# Patient Record
Sex: MALE | Race: OTHER | URBAN - METROPOLITAN AREA
[De-identification: names, ages, dates, MRNs, and addresses within clinical notes are randomized per-mention and may not be internally consistent; named-entity substitution may affect disease eponyms.]

---

## 2018-01-22 ENCOUNTER — OCC HEALTH (OUTPATIENT)
Dept: OCCUPATIONAL MEDICINE | Age: 48
End: 2018-01-22
Attending: PHYSICIAN ASSISTANT

## 2018-01-23 ENCOUNTER — OFFICE VISIT (OUTPATIENT)
Dept: INTERNAL MEDICINE CLINIC | Facility: CLINIC | Age: 48
End: 2018-01-23

## 2018-01-23 VITALS
SYSTOLIC BLOOD PRESSURE: 144 MMHG | WEIGHT: 304.75 LBS | TEMPERATURE: 99 F | OXYGEN SATURATION: 95 % | HEART RATE: 88 BPM | HEIGHT: 68.5 IN | BODY MASS INDEX: 45.66 KG/M2 | DIASTOLIC BLOOD PRESSURE: 104 MMHG | RESPIRATION RATE: 22 BRPM

## 2018-01-23 DIAGNOSIS — I10 ESSENTIAL HYPERTENSION: Primary | ICD-10-CM

## 2018-01-23 PROCEDURE — 99202 OFFICE O/P NEW SF 15 MIN: CPT | Performed by: INTERNAL MEDICINE

## 2018-01-23 RX ORDER — LOSARTAN POTASSIUM 25 MG/1
25 TABLET ORAL DAILY
Qty: 30 TABLET | Refills: 3 | Status: SHIPPED | OUTPATIENT
Start: 2018-01-23

## 2018-01-23 NOTE — PROGRESS NOTES
[address] [facility] Internal Medicine Office Note  Chief Complaint:   Patient presents with:  Establish Care  Blood Pressure: at work screening       HPI:   This is a 52year old male new patient coming in for high blood pressure  HPI  He has had several groomed. Physical Exam   Vitals reviewed. Constitutional: He is obese. No distress. HENT:   Head: Normocephalic and atraumatic. Right Ear: Tympanic membrane is not erythematous. Left Ear: Tympanic membrane is not erythematous.    Mouth/Throat: No p result of today.      Annamaria Vernon MD

## 2018-01-23 NOTE — PATIENT INSTRUCTIONS
1. Start losartan 25mg once daily    2. Monitor blood pressure at home - buy or borrow a home blood pressure cuff. Check blood pressure at least 2 hours after taking medication. (If you take in the evening, it is ok to check in the morning)    3.  Call us i

## 2018-04-08 NOTE — LETTER
May 1, 2019    [address] [address] [facility]      Dear Terencefrankie Worthy:      Our records show that you have chosen Nick Dominguez M.D. as your pcp (primary care physician) however you were last seen in our office on 11/10/2016.   I
Stable

## 2019-04-22 ENCOUNTER — PATIENT OUTREACH (OUTPATIENT)
Dept: INTERNAL MEDICINE CLINIC | Facility: CLINIC | Age: 49
End: 2019-04-22

## 2019-04-22 NOTE — PROGRESS NOTES
Pt was only seen once a year ago, is he still a patient here?  Needs to follow up ASAP if so on HTN and to re-establish care

## 2019-09-27 ENCOUNTER — OFFICE VISIT (OUTPATIENT)
Dept: OCCUPATIONAL MEDICINE | Age: 49
End: 2019-09-27
Attending: PHYSICIAN ASSISTANT

## 2022-05-01 ENCOUNTER — MED REC SCAN ONLY (OUTPATIENT)
Dept: ORTHOPEDICS CLINIC | Facility: CLINIC | Age: 52
End: 2022-05-01

## 2022-05-02 ENCOUNTER — TELEPHONE (OUTPATIENT)
Dept: ORTHOPEDICS CLINIC | Facility: CLINIC | Age: 52
End: 2022-05-02

## 2022-05-02 NOTE — TELEPHONE ENCOUNTER
Left knee xrays ordered. Attempted to contact patient regarding xray order instructions. Pt did not answer, and no voicemail picked up.

## 2022-05-02 NOTE — TELEPHONE ENCOUNTER
Patient coming in for left knee pain. Patient has not had any imaging done. If imaging is required please place Rx. Thank you.     Future Appointments   Date Time Provider Rosa Briggs   5/16/2022 10:00 AM Daniel Calvillo MD [facility] [id number]

## 2022-05-16 ENCOUNTER — HOSPITAL ENCOUNTER (OUTPATIENT)
Dept: GENERAL RADIOLOGY | Age: 52
Discharge: HOME | End: 2022-05-16
Attending: ORTHOPAEDIC SURGERY
Payer: COMMERCIAL

## 2022-05-16 ENCOUNTER — OFFICE VISIT (OUTPATIENT)
Dept: ORTHOPEDICS CLINIC | Facility: CLINIC | Age: 52
End: 2022-05-16
Payer: COMMERCIAL

## 2022-05-16 VITALS — HEIGHT: 69 IN | WEIGHT: 315 LBS | BODY MASS INDEX: 46.65 KG/M2

## 2022-05-16 DIAGNOSIS — S83.207A POSITIVE MCMURRAY TEST OF LEFT KNEE, INITIAL ENCOUNTER: Primary | ICD-10-CM

## 2022-05-16 DIAGNOSIS — M25.562 LEFT KNEE PAIN, UNSPECIFIED CHRONICITY: ICD-10-CM

## 2022-05-16 DIAGNOSIS — Z01.89 ENCOUNTER FOR LOWER EXTREMITY COMPARISON IMAGING STUDY: ICD-10-CM

## 2022-05-16 PROCEDURE — 73562 X-RAY EXAM OF KNEE 3: CPT | Performed by: ORTHOPAEDIC SURGERY

## 2022-05-16 PROCEDURE — 3008F BODY MASS INDEX DOCD: CPT | Performed by: ORTHOPAEDIC SURGERY

## 2022-05-16 PROCEDURE — 73564 X-RAY EXAM KNEE 4 OR MORE: CPT | Performed by: ORTHOPAEDIC SURGERY

## 2022-05-16 PROCEDURE — 99205 OFFICE O/P NEW HI 60 MIN: CPT | Performed by: ORTHOPAEDIC SURGERY

## 2022-05-23 ENCOUNTER — TELEPHONE (OUTPATIENT)
Dept: ORTHOPEDICS CLINIC | Facility: CLINIC | Age: 52
End: 2022-05-23

## 2022-05-23 NOTE — TELEPHONE ENCOUNTER
Future Appointments   Date Time Provider Rosa Brigitte   5/27/2022 10:20 AM Laurence Escobar MD [facility] [id number]     Patient is scheduled for appt. for his MRI results per Dr. Johnson Watts.

## 2022-05-27 ENCOUNTER — OFFICE VISIT (OUTPATIENT)
Dept: ORTHOPEDICS CLINIC | Facility: CLINIC | Age: 52
End: 2022-05-27
Payer: COMMERCIAL

## 2022-05-27 VITALS — HEIGHT: 69 IN | BODY MASS INDEX: 46.65 KG/M2 | WEIGHT: 315 LBS

## 2022-05-27 DIAGNOSIS — S83.232A COMPLEX TEAR OF MEDIAL MENISCUS OF LEFT KNEE AS CURRENT INJURY, INITIAL ENCOUNTER: ICD-10-CM

## 2022-05-27 PROCEDURE — 99215 OFFICE O/P EST HI 40 MIN: CPT | Performed by: ORTHOPAEDIC SURGERY

## 2022-05-27 PROCEDURE — 3008F BODY MASS INDEX DOCD: CPT | Performed by: ORTHOPAEDIC SURGERY

## 2022-05-27 NOTE — PROGRESS NOTES
OR BOOKING SHEET KNEE ARTHROSCOPY  Name: Cheri Urban   MRN: [id number]   : 3/9/1970  Diagnosis:  [x] BMI 40.0-44.9, [facility] [Z68.41]  Disposition:    [x] Ambulatory  Operative Time Required: 1 hour  Procedure:  Antibiotics: 2 g cefazolin within 60 minutes of surgical incision (3 g if > 120 kg)  Laterality: [] RIGHT  [x] LEFT                       [] BILATERAL  Procedures:   [x] Knee Arthroscopy       [x] Partial Medial Meniscectomy (49924)   [x] Synovectomy, 2+ Compartments (02590)   [] Irrigation & Debridement (74733)   [] Manipulation Under Anesthesia (99046)    Injections:   [x] PRP Injection (86669)    [x] Operative knee  [] Non-operative knee   [] Stem cells from bone marrow aspiration (35938)    From:  [] Left Iliac crest  [] Right Iliac crest    To:  [] Left knee  [] Right knee  [] Other     Additional info:   [x] PCP Clearance Needed  [] MRSA  [x] Physical Therapy External - [facility]  [] DME Rx  Implants needed: None  Positioning Equipment: Supine with Lateral Post

## 2022-06-07 ENCOUNTER — TELEPHONE (OUTPATIENT)
Dept: ORTHOPEDICS CLINIC | Facility: CLINIC | Age: 52
End: 2022-06-07

## 2022-06-08 ENCOUNTER — TELEPHONE (OUTPATIENT)
Dept: ORTHOPEDICS CLINIC | Facility: CLINIC | Age: 52
End: 2022-06-08

## 2022-06-08 DIAGNOSIS — S83.232A COMPLEX TEAR OF MEDIAL MENISCUS, CURRENT INJURY, LEFT KNEE, INITIAL ENCOUNTER: Primary | ICD-10-CM

## 2022-06-09 ENCOUNTER — TELEPHONE (OUTPATIENT)
Dept: ORTHOPEDICS CLINIC | Facility: CLINIC | Age: 52
End: 2022-06-09

## 2022-06-09 NOTE — TELEPHONE ENCOUNTER
Patient's PCP office is requesting the pre-op order for this patient's upcoming surgery. Order can be faxed to [phone]. Please advise.

## 2022-06-09 NOTE — TELEPHONE ENCOUNTER
Surgeon: Dr. Willem Gonzales    Date of Surgery: 7/7/22       Post Op Appt: 7/15/22 @ 9am     Prior Authorization:   Inpatient or Outpatient: Outpatient  Facility: [facility]  Work Comp: NO  CPT: 88234, 85872,    DX: Z55.822M    Surgical Assistant: Reddy Dallas    Preadmission Testing: PER ANESTHESIA GUIDELINES     Pre-Op Clearance Requested: HISTORY AND PHYSICAL and MEDICAL CLEARANCE    DME:  NONE NEEDED    Rehab Services Ordered: EXTERNAL THERAPY ACHIEVE Patrick Salvador     Disability Paperwork: NONE    On [place] Calendar: YES    Notes: LT KNEE ARTHROSCOPY PMM

## 2022-06-09 NOTE — TELEPHONE ENCOUNTER
Protocol failed     Requesting: Losartan 25mg     LOV: 1/23/18   RTC: none noted   Filled: 1/23/18 #30 3 refills   Recent Labs: none     Upcoming OV: 6/16/22 with RP

## 2022-06-10 RX ORDER — LOSARTAN POTASSIUM 25 MG/1
25 TABLET ORAL DAILY
Qty: 7 TABLET | Refills: 0 | Status: SHIPPED | OUTPATIENT
Start: 2022-06-10

## 2022-06-16 ENCOUNTER — OFFICE VISIT (OUTPATIENT)
Dept: INTERNAL MEDICINE CLINIC | Facility: CLINIC | Age: 52
End: 2022-06-16
Payer: COMMERCIAL

## 2022-06-16 VITALS
HEIGHT: 68.47 IN | SYSTOLIC BLOOD PRESSURE: 130 MMHG | WEIGHT: 315 LBS | TEMPERATURE: 99 F | OXYGEN SATURATION: 98 % | BODY MASS INDEX: 47.19 KG/M2 | DIASTOLIC BLOOD PRESSURE: 72 MMHG | HEART RATE: 86 BPM | RESPIRATION RATE: 16 BRPM

## 2022-06-16 DIAGNOSIS — S83.232D COMPLEX TEAR OF MEDIAL MENISCUS OF LEFT KNEE AS CURRENT INJURY, SUBSEQUENT ENCOUNTER: ICD-10-CM

## 2022-06-16 DIAGNOSIS — E66.01 MORBID OBESITY (HCC): ICD-10-CM

## 2022-06-16 DIAGNOSIS — I10 PRIMARY HYPERTENSION: ICD-10-CM

## 2022-06-16 DIAGNOSIS — Z01.818 PREOPERATIVE EXAMINATION: Primary | ICD-10-CM

## 2022-06-16 PROCEDURE — 3075F SYST BP GE 130 - 139MM HG: CPT | Performed by: INTERNAL MEDICINE

## 2022-06-16 PROCEDURE — 3008F BODY MASS INDEX DOCD: CPT | Performed by: INTERNAL MEDICINE

## 2022-06-16 PROCEDURE — 99244 OFF/OP CNSLTJ NEW/EST MOD 40: CPT | Performed by: INTERNAL MEDICINE

## 2022-06-16 PROCEDURE — 3078F DIAST BP <80 MM HG: CPT | Performed by: INTERNAL MEDICINE

## 2022-06-16 RX ORDER — LOSARTAN POTASSIUM 50 MG/1
50 TABLET ORAL DAILY
Qty: 90 TABLET | Refills: 1 | Status: SHIPPED | OUTPATIENT
Start: 2022-06-16

## 2022-06-20 ENCOUNTER — TELEPHONE (OUTPATIENT)
Dept: INTERNAL MEDICINE CLINIC | Facility: CLINIC | Age: 52
End: 2022-06-20

## 2022-06-20 NOTE — TELEPHONE ENCOUNTER
Outgoing Fax   Faxed H&P and Clearance to   (f) [phone] and (f) [phone]  Status CP for both numbers

## 2022-07-01 ENCOUNTER — LABORATORY ENCOUNTER (OUTPATIENT)
Dept: LAB | Age: 52
End: 2022-07-01
Attending: ORTHOPAEDIC SURGERY
Payer: COMMERCIAL

## 2022-07-01 ENCOUNTER — EKG ENCOUNTER (OUTPATIENT)
Dept: LAB | Age: 52
End: 2022-07-01
Attending: ORTHOPAEDIC SURGERY
Payer: COMMERCIAL

## 2022-07-01 DIAGNOSIS — Z20.822 ENCOUNTER FOR PREOPERATIVE SCREENING LABORATORY TESTING FOR COVID-19 VIRUS: ICD-10-CM

## 2022-07-01 DIAGNOSIS — Z01.812 ENCOUNTER FOR PREOPERATIVE SCREENING LABORATORY TESTING FOR COVID-19 VIRUS: ICD-10-CM

## 2022-07-01 LAB
ANION GAP SERPL CALC-SCNC: 4 MMOL/L (ref 0–18)
ATRIAL RATE: 83 BPM
BUN BLD-MCNC: 11 MG/DL (ref 7–18)
CALCIUM BLD-MCNC: 9.1 MG/DL (ref 8.5–10.1)
CHLORIDE SERPL-SCNC: 109 MMOL/L (ref 98–112)
CO2 SERPL-SCNC: 27 MMOL/L (ref 21–32)
CREAT BLD-MCNC: 1.22 MG/DL
FASTING STATUS PATIENT QL REPORTED: YES
GLUCOSE BLD-MCNC: 106 MG/DL (ref 70–99)
OSMOLALITY SERPL CALC.SUM OF ELEC: 290 MOSM/KG (ref 275–295)
P AXIS: 49 DEGREES
P-R INTERVAL: 158 MS
POTASSIUM SERPL-SCNC: 3.9 MMOL/L (ref 3.5–5.1)
Q-T INTERVAL: 374 MS
QRS DURATION: 90 MS
QTC CALCULATION (BEZET): 439 MS
R AXIS: 1 DEGREES
SODIUM SERPL-SCNC: 140 MMOL/L (ref 136–145)
T AXIS: 49 DEGREES
VENTRICULAR RATE: 83 BPM

## 2022-07-01 PROCEDURE — 80048 BASIC METABOLIC PNL TOTAL CA: CPT

## 2022-07-01 PROCEDURE — 93005 ELECTROCARDIOGRAM TRACING: CPT

## 2022-07-01 PROCEDURE — 36415 COLL VENOUS BLD VENIPUNCTURE: CPT

## 2022-07-01 PROCEDURE — 93010 ELECTROCARDIOGRAM REPORT: CPT | Performed by: INTERNAL MEDICINE

## 2022-07-05 ENCOUNTER — LAB ENCOUNTER (OUTPATIENT)
Dept: LAB | Age: 52
End: 2022-07-05
Attending: ORTHOPAEDIC SURGERY
Payer: COMMERCIAL

## 2022-07-05 ENCOUNTER — TELEPHONE (OUTPATIENT)
Dept: ORTHOPEDICS CLINIC | Facility: CLINIC | Age: 52
End: 2022-07-05

## 2022-07-05 DIAGNOSIS — Z01.812 ENCOUNTER FOR PREOPERATIVE SCREENING LABORATORY TESTING FOR COVID-19 VIRUS: ICD-10-CM

## 2022-07-05 DIAGNOSIS — Z20.822 ENCOUNTER FOR PREOPERATIVE SCREENING LABORATORY TESTING FOR COVID-19 VIRUS: ICD-10-CM

## 2022-07-05 LAB — SARS-COV-2 RNA RESP QL NAA+PROBE: NOT DETECTED

## 2022-07-05 RX ORDER — ONDANSETRON 4 MG/1
4 TABLET, ORALLY DISINTEGRATING ORAL EVERY 8 HOURS PRN
Qty: 8 TABLET | Refills: 0 | Status: SHIPPED | OUTPATIENT
Start: 2022-07-05 | End: 2022-07-15

## 2022-07-05 RX ORDER — TRAMADOL HYDROCHLORIDE 50 MG/1
TABLET ORAL
Qty: 20 TABLET | Refills: 1 | Status: SHIPPED | OUTPATIENT
Start: 2022-07-05 | End: 2022-07-15

## 2022-07-05 RX ORDER — MELOXICAM 15 MG/1
15 TABLET ORAL DAILY
Qty: 14 TABLET | Refills: 1 | Status: SHIPPED | OUTPATIENT
Start: 2022-07-05

## 2022-07-06 ENCOUNTER — ANESTHESIA EVENT (OUTPATIENT)
Dept: SURGERY | Facility: HOSPITAL | Age: 52
End: 2022-07-06
Payer: COMMERCIAL

## 2022-07-07 ENCOUNTER — ANESTHESIA (OUTPATIENT)
Dept: SURGERY | Facility: HOSPITAL | Age: 52
End: 2022-07-07
Payer: COMMERCIAL

## 2022-07-07 ENCOUNTER — HOSPITAL ENCOUNTER (OUTPATIENT)
Facility: HOSPITAL | Age: 52
Setting detail: HOSPITAL OUTPATIENT SURGERY
Discharge: HOME | End: 2022-07-07
Attending: ORTHOPAEDIC SURGERY | Admitting: ORTHOPAEDIC SURGERY
Payer: COMMERCIAL

## 2022-07-07 VITALS
BODY MASS INDEX: 47.19 KG/M2 | TEMPERATURE: 97 F | HEIGHT: 68.5 IN | RESPIRATION RATE: 16 BRPM | SYSTOLIC BLOOD PRESSURE: 153 MMHG | OXYGEN SATURATION: 96 % | HEART RATE: 83 BPM | DIASTOLIC BLOOD PRESSURE: 101 MMHG | WEIGHT: 315 LBS

## 2022-07-07 DIAGNOSIS — Z01.812 ENCOUNTER FOR PREOPERATIVE SCREENING LABORATORY TESTING FOR COVID-19 VIRUS: Primary | ICD-10-CM

## 2022-07-07 DIAGNOSIS — S83.232A COMPLEX TEAR OF MEDIAL MENISCUS, CURRENT INJURY, LEFT KNEE, INITIAL ENCOUNTER: ICD-10-CM

## 2022-07-07 DIAGNOSIS — Z20.822 ENCOUNTER FOR PREOPERATIVE SCREENING LABORATORY TESTING FOR COVID-19 VIRUS: Primary | ICD-10-CM

## 2022-07-07 DIAGNOSIS — Z98.890 S/P ARTHROSCOPY OF LEFT KNEE: ICD-10-CM

## 2022-07-07 PROCEDURE — 0SBD4ZZ EXCISION OF LEFT KNEE JOINT, PERCUTANEOUS ENDOSCOPIC APPROACH: ICD-10-PCS | Performed by: ORTHOPAEDIC SURGERY

## 2022-07-07 PROCEDURE — 76942 ECHO GUIDE FOR BIOPSY: CPT | Performed by: ANESTHESIOLOGY

## 2022-07-07 RX ORDER — SODIUM CHLORIDE, SODIUM LACTATE, POTASSIUM CHLORIDE, CALCIUM CHLORIDE 600; 310; 30; 20 MG/100ML; MG/100ML; MG/100ML; MG/100ML
INJECTION, SOLUTION INTRAVENOUS CONTINUOUS
Status: DISCONTINUED | OUTPATIENT
Start: 2022-07-07 | End: 2022-07-07

## 2022-07-07 RX ORDER — HYDROCODONE BITARTRATE AND ACETAMINOPHEN 5; 325 MG/1; MG/1
2 TABLET ORAL ONCE AS NEEDED
Status: COMPLETED | OUTPATIENT
Start: 2022-07-07 | End: 2022-07-07

## 2022-07-07 RX ORDER — KETOROLAC TROMETHAMINE 30 MG/ML
INJECTION, SOLUTION INTRAMUSCULAR; INTRAVENOUS AS NEEDED
Status: DISCONTINUED | OUTPATIENT
Start: 2022-07-07 | End: 2022-07-07 | Stop reason: SURG

## 2022-07-07 RX ORDER — MIDAZOLAM HYDROCHLORIDE 1 MG/ML
INJECTION INTRAMUSCULAR; INTRAVENOUS AS NEEDED
Status: DISCONTINUED | OUTPATIENT
Start: 2022-07-07 | End: 2022-07-07 | Stop reason: SURG

## 2022-07-07 RX ORDER — DEXAMETHASONE SODIUM PHOSPHATE 4 MG/ML
VIAL (ML) INJECTION AS NEEDED
Status: DISCONTINUED | OUTPATIENT
Start: 2022-07-07 | End: 2022-07-07 | Stop reason: SURG

## 2022-07-07 RX ORDER — ONDANSETRON 2 MG/ML
4 INJECTION INTRAMUSCULAR; INTRAVENOUS EVERY 6 HOURS PRN
Status: DISCONTINUED | OUTPATIENT
Start: 2022-07-07 | End: 2022-07-07

## 2022-07-07 RX ORDER — HYDROMORPHONE HYDROCHLORIDE 1 MG/ML
0.4 INJECTION, SOLUTION INTRAMUSCULAR; INTRAVENOUS; SUBCUTANEOUS EVERY 5 MIN PRN
Status: DISCONTINUED | OUTPATIENT
Start: 2022-07-07 | End: 2022-07-07

## 2022-07-07 RX ORDER — SCOLOPAMINE TRANSDERMAL SYSTEM 1 MG/1
1 PATCH, EXTENDED RELEASE TRANSDERMAL ONCE
Status: DISCONTINUED | OUTPATIENT
Start: 2022-07-07 | End: 2022-07-07 | Stop reason: HOSPADM

## 2022-07-07 RX ORDER — PROCHLORPERAZINE EDISYLATE 5 MG/ML
5 INJECTION INTRAMUSCULAR; INTRAVENOUS EVERY 8 HOURS PRN
Status: DISCONTINUED | OUTPATIENT
Start: 2022-07-07 | End: 2022-07-07

## 2022-07-07 RX ORDER — BUPIVACAINE HYDROCHLORIDE AND EPINEPHRINE 5; 5 MG/ML; UG/ML
INJECTION, SOLUTION EPIDURAL; INTRACAUDAL; PERINEURAL AS NEEDED
Status: DISCONTINUED | OUTPATIENT
Start: 2022-07-07 | End: 2022-07-07 | Stop reason: HOSPADM

## 2022-07-07 RX ORDER — HYDROCODONE BITARTRATE AND ACETAMINOPHEN 5; 325 MG/1; MG/1
1 TABLET ORAL ONCE AS NEEDED
Status: COMPLETED | OUTPATIENT
Start: 2022-07-07 | End: 2022-07-07

## 2022-07-07 RX ORDER — HYDROMORPHONE HYDROCHLORIDE 1 MG/ML
0.2 INJECTION, SOLUTION INTRAMUSCULAR; INTRAVENOUS; SUBCUTANEOUS EVERY 5 MIN PRN
Status: DISCONTINUED | OUTPATIENT
Start: 2022-07-07 | End: 2022-07-07

## 2022-07-07 RX ORDER — HYDROMORPHONE HYDROCHLORIDE 1 MG/ML
0.6 INJECTION, SOLUTION INTRAMUSCULAR; INTRAVENOUS; SUBCUTANEOUS EVERY 5 MIN PRN
Status: DISCONTINUED | OUTPATIENT
Start: 2022-07-07 | End: 2022-07-07

## 2022-07-07 RX ORDER — ONDANSETRON 2 MG/ML
INJECTION INTRAMUSCULAR; INTRAVENOUS AS NEEDED
Status: DISCONTINUED | OUTPATIENT
Start: 2022-07-07 | End: 2022-07-07 | Stop reason: SURG

## 2022-07-07 RX ORDER — NALOXONE HYDROCHLORIDE 0.4 MG/ML
80 INJECTION, SOLUTION INTRAMUSCULAR; INTRAVENOUS; SUBCUTANEOUS AS NEEDED
Status: DISCONTINUED | OUTPATIENT
Start: 2022-07-07 | End: 2022-07-07

## 2022-07-07 RX ORDER — HYDROMORPHONE HYDROCHLORIDE 1 MG/ML
INJECTION, SOLUTION INTRAMUSCULAR; INTRAVENOUS; SUBCUTANEOUS
Status: COMPLETED
Start: 2022-07-07 | End: 2022-07-07

## 2022-07-07 RX ORDER — CEFAZOLIN SODIUM IN 0.9 % NACL 3 G/100 ML
3 INTRAVENOUS SOLUTION, PIGGYBACK (ML) INTRAVENOUS ONCE
Status: DISCONTINUED | OUTPATIENT
Start: 2022-07-07 | End: 2022-07-07 | Stop reason: HOSPADM

## 2022-07-07 RX ORDER — METOCLOPRAMIDE HYDROCHLORIDE 5 MG/ML
INJECTION INTRAMUSCULAR; INTRAVENOUS AS NEEDED
Status: DISCONTINUED | OUTPATIENT
Start: 2022-07-07 | End: 2022-07-07 | Stop reason: SURG

## 2022-07-07 RX ORDER — LIDOCAINE HYDROCHLORIDE 10 MG/ML
INJECTION, SOLUTION EPIDURAL; INFILTRATION; INTRACAUDAL; PERINEURAL AS NEEDED
Status: DISCONTINUED | OUTPATIENT
Start: 2022-07-07 | End: 2022-07-07 | Stop reason: SURG

## 2022-07-07 RX ORDER — ACETAMINOPHEN 500 MG
1000 TABLET ORAL ONCE AS NEEDED
Status: COMPLETED | OUTPATIENT
Start: 2022-07-07 | End: 2022-07-07

## 2022-07-07 RX ORDER — ACETAMINOPHEN 500 MG
1000 TABLET ORAL ONCE
Status: DISCONTINUED | OUTPATIENT
Start: 2022-07-07 | End: 2022-07-07 | Stop reason: HOSPADM

## 2022-07-07 RX ADMIN — DEXAMETHASONE SODIUM PHOSPHATE 4 MG: 4 MG/ML VIAL (ML) INJECTION at 07:22:00

## 2022-07-07 RX ADMIN — MIDAZOLAM HYDROCHLORIDE 2 MG: 1 INJECTION INTRAMUSCULAR; INTRAVENOUS at 07:00:00

## 2022-07-07 RX ADMIN — SODIUM CHLORIDE, SODIUM LACTATE, POTASSIUM CHLORIDE, CALCIUM CHLORIDE: 600; 310; 30; 20 INJECTION, SOLUTION INTRAVENOUS at 08:14:00

## 2022-07-07 RX ADMIN — KETOROLAC TROMETHAMINE 30 MG: 30 INJECTION, SOLUTION INTRAMUSCULAR; INTRAVENOUS at 08:10:00

## 2022-07-07 RX ADMIN — SODIUM CHLORIDE, SODIUM LACTATE, POTASSIUM CHLORIDE, CALCIUM CHLORIDE: 600; 310; 30; 20 INJECTION, SOLUTION INTRAVENOUS at 07:00:00

## 2022-07-07 RX ADMIN — METOCLOPRAMIDE HYDROCHLORIDE 10 MG: 5 INJECTION INTRAMUSCULAR; INTRAVENOUS at 07:22:00

## 2022-07-07 RX ADMIN — LIDOCAINE HYDROCHLORIDE 100 MG: 10 INJECTION, SOLUTION EPIDURAL; INFILTRATION; INTRACAUDAL; PERINEURAL at 07:00:00

## 2022-07-07 RX ADMIN — ONDANSETRON 4 MG: 2 INJECTION INTRAMUSCULAR; INTRAVENOUS at 07:22:00

## 2022-07-07 NOTE — BRIEF OP NOTE
Pre-Operative Diagnosis: Complex tear of medial meniscus, current injury, left knee, initial encounter [S83.232A]     Post-Operative Diagnosis: Complex tear of medial meniscus, current injury, left knee, initial encounter [S83.232A]      Procedure Performed:   LEFT KNEE ARTHROSCOPY PARTIAL MEDIAL MENISCECTOMY SYNOVECTOMY 2 OR MORE COMPARTMENTS, PLATELET RICH PLASMA INJECTION IN OPERATIVE KNEE. Surgeon(s) and Role:     * Gracie Rodriguez MD - Primary    Assistant(s):  PA: RUTH ANN Alexander     Surgical Findings: On arthroscopic examination the posterior horn of the medial meniscus demonstrated a complex radial and horizontal tear pattern at the posterior aspect of zone b, that underwent debridement with 20% removal. The lateral meniscus was intact without pathology. Grade II changes of the patellofemoral compartment, well maintained medial and lateral compartments. Portal incisions were closed in standard fashion without complication, hemostasis was achieved and PRP was injected.       Estimated Blood Loss: Blood Output: 5 mL (7/7/2022  7:55 AM)    Deforest Ganser, PA  7/7/2022  8:17 AM

## 2022-07-07 NOTE — ANESTHESIA PROCEDURE NOTES
Arterial Line  Performed by: Toya Cr MD  Authorized by: Toya Cr MD     General Information and Staff    Procedure Start:  7/7/2022 7:19 AM  Procedure End:  7/7/2022 7:20 AM  Anesthesiologist:  Toya Cr MD  Performed By:  Anesthesiologist  Patient Location:  OR  Indication: continuous blood pressure monitoring and blood sampling needed    Site Identification: real time ultrasound guided, surface landmarks and image stored and retrievable    Preanesthetic Checklist: 2 patient identifiers, IV checked, risks and benefits discussed, monitors and equipment checked, pre-op evaluation, timeout performed, anesthesia consent and sterile technique used    Procedure Details    Catheter Size:  20 G  Catheter Length:  1 and 3/4 inchCatheter Type:  Angiocath  Seldinger Technique?: Yes    Laterality:  LeftSite:  Radial artery  Site Prep: chlorhexidine  Line Secured:  Wrist Brace, tape and Tegaderm    Assessment    Events: patient tolerated procedure well with no complications      Medications      Additional Comments

## 2022-07-07 NOTE — OPERATIVE REPORT
[facility] OPERATIVE RECORD  ATTENDING SURGEON: Mable Carrero MD  ASSISTANT(S): Richy Nguyen PA-C  STATEMENT OF MEDICAL NECESSITY  The aid of assistant Richy Nguyen PA-C was needed for patient positioning, preparation, drape, retraction,  wound closure, dressing application and critical portions of the procedure. PRELIMINARY DIAGNOSIS:  1. Left Medial Meniscus Tear  2. Left Patellofemoral Chondromalacia     POSTOPERATIVE DIAGNOSIS:  1. Left Medial Meniscus Tear  2. Left Patellofemoral Chondromalacia  3. Left Knee synovitis involving multiple compartments    THE OPERATION:  1. Left Knee Arthroscopy, Partial Medial Meniscectomy (05933)  2. Left Knee arthroscopic extensive debridement and synovectomy involving multiple compartments (68568)      ANESTHESIA: General Endotracheal  ANESTHESIOLOGIST:  MD Deloris  ANTIBIOTICS: Cefazolin 2g within 60 minutes of surgical incision. IMPLANTS:   None  PATHOLOGY/CULTURES: None  ESTIMATED BLOOD LOSS: Blood Output: 5 mL (7/7/2022  7:55 AM)    DRAINS: None  COMPLICATIONS: No intraoperative nor immediate postoperative complications noted. COUNTS: All sponge and needle counts were correct at the conclusion of the procedure. TOURNIQUET TIME: Not Applicable  OPERATIVE FINDINGS:  On arthroscopic examination the posterior horn of the medial meniscus demonstrated a complex radial and horizontal tear pattern at the posterior aspect of zone b, that underwent debridement with 20% removal. The lateral meniscus was intact without pathology. Grade II changes of the patellofemoral compartment, well maintained medial and lateral compartments. Portal incisions were closed in standard fashion without complication, hemostasis was achieved and PRP was injected. Indications:  Sera Arroyo is a 46year old male with history, physical examination, and imaging findings consistent with medial meniscus pathology that has been managed with extensive nonsurgical management.   Physical therapy greater than 3 months, intra-articular corticosteroid and ketorolac injection, activity modification, and anti-inflammatory medications without successful symptomatic resolution. Operative and nonoperative options were discussed with him in my office and we ultimately agreed that surgery would provide the highest likelihood of symptomatic relief and functional improvement. The risks and benefits of surgery as well as the postoperative rehabilitation plan were reviewed. He voiced a good understanding of treatment options, risks and benefits, and alternatives to surgery. He was given the opportunity to ask questions, which were all answered to the best of my ability and to his satisfaction. Remington wished to proceed. On the day of surgery, the Trisha Kat was seen in the preoperative area. I confirmed his identity by name and birthday. We reviewed and confirmed the surgical consent including laterality. The surgical site was marked with my initials. We re-reviewed the risks and benefits of the procedure and I answered all additional questions to his satisfaction. OPERATIVE REPORT:   Trisha Kat was taken to the operating room in stable condition. A clear 1010 drape x 2 was applied to the upper thigh. An adjustable lateral post was utilized. The extremity underwent a prescrub with chlorhexidine and alcohol followed by a chlorhexidine formal preparation. A preoperative timeout was performed confirming the correct surgical site, procedure, and preoperative imaging was reviewed. Exam under anesthesia demonstrated a Stable knee with normal range of motion. Diagnostic knee arthroscopy was performed with standard anterolateral visualization portal was made utilizing a 15 blade, and an arthroscope was introduced. The medial working portal was established under spinal needle localization and diagnostic arthroscopy was commenced.           Diagnostic arthroscopy revealed:      Suprapatellar No evidence of loose bodies   Patellofemoral  grade 2 changes on the patella along the medial and lateral patellar facets managed with chondroplasty. Gutters Clear without evidence of loose bodies or osteophytes. Lateral compartment Grade 1 tibial cartilage  Grade 1 femoral cartilage  Lateral meniscus intact without tearing. Medial compartment Grade 2 tibial cartilage  Grade 2 femoral cartilage   Medial meniscus tearing involving Zone B with complex radial and horizontal components. Adequately debrided to a stable margin. No pathologic plica   Ligaments ACL Intact. PCL intact  Popliteus intact    Other Mild to moderate synovitis involving the medial, lateral and patellofemoral compartments managed with synovectomy using curved mechanical 4.0 mm shaver and coablation device. Within the medial compartment, approximately 20% of the meniscus volume was extracted utilizing a biter, curved 4.0 mm mechanical shaver. Peripheral fraying was ablated with a Coblation device to prevent further propagation of tearing. Comprehensive partial meniscectomy was performed. Within the patellofemoral compartment, a 4.0 mm curved mechanical shaver was used to debride chondral surfaces of the patella and trochlea to stable margin. Extensive synovectomy was performed in all 3 compartments with the aid of 4.0 mm curved mechanical shaver and Coblation to ensure hemostasis. Adequate hemostasis were noted. Wound closure was performed with buried 3-0 monocryl and overlying Exofin and steri strips were applied. At this point in time approximately 30 cc of Marcaine with epinephrine were utilized to provide local anesthesia. An 18-gauge Angiocath, placed under direct visualization was used to deliver approximately 6 cc of Platelet Rich Plasma (PRP) via [employer] . 4 x 4 and Tegaderm, Henny style dressing was applied. The knee was wrapped in a 6 inch Ace wrap. The final count prior to closure was correct.  The patient tolerated the procedure well without complications. Mat Oviedo was taken to the recovery room in a stable condition with plan for ambulatory discharge to home. He was provided my postoperative discharge booklet, appropriate home exercises, script for outpatient physical therapy, and a copy of my rehabilitation guidelines. POST OPERATIVE PLAN:  1. Activity Precautions: WBAT / Kenya Heck. To begin physical therapy ASAP. 2. DVT Prophylaxis: Not indicated as patient is ambulatory. 3. Follow-up Visit: POD #7-14        Luz Watts MD  Knee, Shoulder, & Elbow Surgery / Sports Medicine Specialist  [facility]  [place] [address], [facility], [address] [facility]. [email or web address]  t: [phone]  o: [phone]  f: [phone]        This note was dictated using Dragon software. While it was briefly proofread prior to completion, some grammatical, spelling, and word choice errors due to dictation may still occur. The patient was positioned in the supine position. They subsequently underwent standard prep and drape. No Passed

## 2022-07-07 NOTE — ANESTHESIA PROCEDURE NOTES
Airway  Date/Time: 7/7/2022 7:09 AM  Urgency: elective      General Information and Staff    Patient location during procedure: OR  Anesthesiologist: Demetrius Rinaldi MD  Performed: anesthesiologist     Indications and Patient Condition  Indications for airway management: anesthesia  Spontaneous Ventilation: absent  Sedation level: deep  Preoxygenated: yes  Patient position: sniffing  Mask difficulty assessment: 0 - not attempted    Final Airway Details  Final airway type: supraglottic airway      Successful airway: classic  Size 4      Number of attempts at approach: 1    Additional Comments  Smooth, atraumatic placement, x1.  Well-seated, +etco2, prompt resumption of SV

## 2022-07-08 ENCOUNTER — TELEPHONE (OUTPATIENT)
Dept: ORTHOPEDICS CLINIC | Facility: CLINIC | Age: 52
End: 2022-07-08

## 2022-07-15 ENCOUNTER — OFFICE VISIT (OUTPATIENT)
Dept: ORTHOPEDICS CLINIC | Facility: CLINIC | Age: 52
End: 2022-07-15
Payer: COMMERCIAL

## 2022-07-15 DIAGNOSIS — Z98.890 S/P ARTHROSCOPY OF KNEE: Primary | ICD-10-CM

## 2022-07-15 PROCEDURE — 99024 POSTOP FOLLOW-UP VISIT: CPT | Performed by: ORTHOPAEDIC SURGERY

## 2022-07-20 ENCOUNTER — TELEPHONE (OUTPATIENT)
Dept: ORTHOPEDICS CLINIC | Facility: CLINIC | Age: 52
End: 2022-07-20

## 2022-07-20 NOTE — TELEPHONE ENCOUNTER
Patient is asking if we received a fax from his insurance- [employer] regarding his Attending Physician Statement that needed to be signed by doctor. Please advise. Thanks.     Patient can be reached at [phone]

## 2022-07-25 RX ORDER — MELOXICAM 15 MG/1
15 TABLET ORAL DAILY
Qty: 14 TABLET | Refills: 1 | Status: SHIPPED | OUTPATIENT
Start: 2022-07-25

## 2022-08-18 ENCOUNTER — OFFICE VISIT (OUTPATIENT)
Dept: ORTHOPEDICS CLINIC | Facility: CLINIC | Age: 52
End: 2022-08-18
Payer: COMMERCIAL

## 2022-08-18 DIAGNOSIS — Z98.890 S/P ARTHROSCOPY OF KNEE: Primary | ICD-10-CM

## 2022-08-18 PROCEDURE — 99024 POSTOP FOLLOW-UP VISIT: CPT | Performed by: PHYSICIAN ASSISTANT

## 2022-12-05 ENCOUNTER — HOSPITAL ENCOUNTER (OUTPATIENT)
Age: 52
Discharge: HOME | End: 2022-12-05
Attending: EMERGENCY MEDICINE
Payer: COMMERCIAL

## 2022-12-05 ENCOUNTER — APPOINTMENT (OUTPATIENT)
Dept: CT IMAGING | Age: 52
End: 2022-12-05
Attending: EMERGENCY MEDICINE
Payer: COMMERCIAL

## 2022-12-05 VITALS
BODY MASS INDEX: 49 KG/M2 | HEART RATE: 94 BPM | OXYGEN SATURATION: 96 % | TEMPERATURE: 98 F | RESPIRATION RATE: 16 BRPM | DIASTOLIC BLOOD PRESSURE: 114 MMHG | SYSTOLIC BLOOD PRESSURE: 174 MMHG | WEIGHT: 315 LBS

## 2022-12-05 DIAGNOSIS — K57.92 ACUTE DIVERTICULITIS: Primary | ICD-10-CM

## 2022-12-05 LAB
#MXD IC: 0.9 X10ˆ3/UL (ref 0.1–1)
ALBUMIN SERPL-MCNC: 3.5 G/DL (ref 3.4–5)
ALP LIVER SERPL-CCNC: 103 U/L
ALT SERPL-CCNC: 31 U/L
AST SERPL-CCNC: 17 U/L (ref 15–37)
ATRIAL RATE: 92 BPM
BILIRUB DIRECT SERPL-MCNC: <0.1 MG/DL (ref 0–0.2)
BILIRUB SERPL-MCNC: 0.5 MG/DL (ref 0.1–2)
BUN BLD-MCNC: 10 MG/DL (ref 7–18)
CHLORIDE BLD-SCNC: 102 MMOL/L (ref 98–112)
CO2 BLD-SCNC: 31 MMOL/L (ref 21–32)
CREAT BLD-MCNC: 1.1 MG/DL
DDIMER WHOLE BLOOD: 1390 NG/ML DDU (ref ?–400)
GFR SERPLBLD BASED ON 1.73 SQ M-ARVRAT: 81 ML/MIN/1.73M2 (ref 60–?)
GLUCOSE BLD-MCNC: 127 MG/DL (ref 70–99)
HCT VFR BLD AUTO: 43 %
HCT VFR BLD CALC: 43 %
HGB BLD-MCNC: 13.9 G/DL
ISTAT IONIZED CALCIUM FOR CHEM 8: 1.2 MMOL/L (ref 1.12–1.32)
LIPASE SERPL-CCNC: 163 U/L (ref 73–393)
LYMPHOCYTES # BLD AUTO: 1.2 X10ˆ3/UL (ref 1–4)
LYMPHOCYTES NFR BLD AUTO: 14.4 %
MCH RBC QN AUTO: 28.9 PG (ref 26–34)
MCHC RBC AUTO-ENTMCNC: 32.3 G/DL (ref 31–37)
MCV RBC AUTO: 89.4 FL (ref 80–100)
MIXED CELL %: 11 %
NEUTROPHILS # BLD AUTO: 6.1 X10ˆ3/UL (ref 1.5–7.7)
NEUTROPHILS NFR BLD AUTO: 74.6 %
P AXIS: 46 DEGREES
P-R INTERVAL: 154 MS
PLATELET # BLD AUTO: 224 X10ˆ3/UL (ref 150–450)
POCT BILIRUBIN URINE: NEGATIVE
POCT BLOOD URINE: NEGATIVE
POCT GLUCOSE URINE: NEGATIVE MG/DL
POCT KETONE URINE: NEGATIVE MG/DL
POCT LEUKOCYTE ESTERASE URINE: NEGATIVE
POCT NITRITE URINE: NEGATIVE
POCT PH URINE: 6 (ref 5–8)
POCT PROTEIN URINE: 30 MG/DL
POCT SPECIFIC GRAVITY URINE: 1.03
POCT URINE CLARITY: CLEAR
POCT UROBILINOGEN URINE: 1 MG/DL
POTASSIUM BLD-SCNC: 3.9 MMOL/L (ref 3.6–5.1)
PROT SERPL-MCNC: 8.2 G/DL (ref 6.4–8.2)
Q-T INTERVAL: 376 MS
QRS DURATION: 92 MS
QTC CALCULATION (BEZET): 464 MS
R AXIS: 9 DEGREES
RBC # BLD AUTO: 4.81 X10ˆ6/UL
SODIUM BLD-SCNC: 141 MMOL/L (ref 136–145)
T AXIS: 51 DEGREES
TROPONIN I BLD-MCNC: <0.02 NG/ML
VENTRICULAR RATE: 92 BPM
WBC # BLD AUTO: 8.2 X10ˆ3/UL (ref 4–11)

## 2022-12-05 PROCEDURE — 74177 CT ABD & PELVIS W/CONTRAST: CPT | Performed by: EMERGENCY MEDICINE

## 2022-12-05 PROCEDURE — 99215 OFFICE O/P EST HI 40 MIN: CPT

## 2022-12-05 PROCEDURE — 71275 CT ANGIOGRAPHY CHEST: CPT | Performed by: EMERGENCY MEDICINE

## 2022-12-05 PROCEDURE — 36415 COLL VENOUS BLD VENIPUNCTURE: CPT

## 2022-12-05 PROCEDURE — 81002 URINALYSIS NONAUTO W/O SCOPE: CPT | Performed by: EMERGENCY MEDICINE

## 2022-12-05 PROCEDURE — 80047 BASIC METABLC PNL IONIZED CA: CPT

## 2022-12-05 PROCEDURE — 85025 COMPLETE CBC W/AUTO DIFF WBC: CPT | Performed by: EMERGENCY MEDICINE

## 2022-12-05 PROCEDURE — 84484 ASSAY OF TROPONIN QUANT: CPT

## 2022-12-05 PROCEDURE — 99204 OFFICE O/P NEW MOD 45 MIN: CPT

## 2022-12-05 PROCEDURE — 83690 ASSAY OF LIPASE: CPT | Performed by: EMERGENCY MEDICINE

## 2022-12-05 PROCEDURE — 93005 ELECTROCARDIOGRAM TRACING: CPT

## 2022-12-05 PROCEDURE — 85378 FIBRIN DEGRADE SEMIQUANT: CPT | Performed by: EMERGENCY MEDICINE

## 2022-12-05 PROCEDURE — 80076 HEPATIC FUNCTION PANEL: CPT | Performed by: EMERGENCY MEDICINE

## 2022-12-05 PROCEDURE — 93010 ELECTROCARDIOGRAM REPORT: CPT

## 2022-12-05 RX ORDER — METRONIDAZOLE 500 MG/1
500 TABLET ORAL 4 TIMES DAILY
Qty: 40 TABLET | Refills: 0 | Status: SHIPPED | OUTPATIENT
Start: 2022-12-05 | End: 2022-12-15

## 2022-12-05 RX ORDER — LEVOFLOXACIN 500 MG/1
500 TABLET, FILM COATED ORAL DAILY
Qty: 10 TABLET | Refills: 0 | Status: SHIPPED | OUTPATIENT
Start: 2022-12-05 | End: 2022-12-15

## 2022-12-05 RX ORDER — ONDANSETRON 4 MG/1
4 TABLET, ORALLY DISINTEGRATING ORAL EVERY 4 HOURS PRN
Qty: 20 TABLET | Refills: 0 | Status: SHIPPED | OUTPATIENT
Start: 2022-12-05 | End: 2022-12-10

## 2022-12-05 NOTE — ED INITIAL ASSESSMENT (HPI)
Felt twinge of pain to right side of abdomen when he went to pick something up on Thursday. Denies N/V/D, fevers.

## 2023-01-28 ENCOUNTER — LAB ENCOUNTER (OUTPATIENT)
Dept: LAB | Age: 53
End: 2023-01-28
Attending: INTERNAL MEDICINE
Payer: COMMERCIAL

## 2023-01-28 ENCOUNTER — TELEPHONE (OUTPATIENT)
Dept: INTERNAL MEDICINE CLINIC | Facility: CLINIC | Age: 53
End: 2023-01-28

## 2023-01-28 ENCOUNTER — OFFICE VISIT (OUTPATIENT)
Dept: INTERNAL MEDICINE CLINIC | Facility: CLINIC | Age: 53
End: 2023-01-28
Payer: COMMERCIAL

## 2023-01-28 VITALS
WEIGHT: 315 LBS | TEMPERATURE: 98 F | BODY MASS INDEX: 47.19 KG/M2 | SYSTOLIC BLOOD PRESSURE: 154 MMHG | HEIGHT: 68.5 IN | HEART RATE: 90 BPM | OXYGEN SATURATION: 97 % | RESPIRATION RATE: 16 BRPM | DIASTOLIC BLOOD PRESSURE: 94 MMHG

## 2023-01-28 DIAGNOSIS — Z00.00 ENCOUNTER FOR ROUTINE ADULT MEDICAL EXAMINATION: Primary | ICD-10-CM

## 2023-01-28 DIAGNOSIS — M79.672 PAIN IN BOTH FEET: ICD-10-CM

## 2023-01-28 DIAGNOSIS — I10 ESSENTIAL HYPERTENSION: ICD-10-CM

## 2023-01-28 DIAGNOSIS — Z00.00 LABORATORY EXAM ORDERED AS PART OF ROUTINE GENERAL MEDICAL EXAMINATION: ICD-10-CM

## 2023-01-28 DIAGNOSIS — R79.89 LOW TESTOSTERONE: ICD-10-CM

## 2023-01-28 DIAGNOSIS — Z12.5 SCREENING FOR PROSTATE CANCER: ICD-10-CM

## 2023-01-28 DIAGNOSIS — H53.8 BLURRED VISION: ICD-10-CM

## 2023-01-28 DIAGNOSIS — Z23 NEED FOR SHINGLES VACCINE: ICD-10-CM

## 2023-01-28 DIAGNOSIS — M79.671 PAIN IN BOTH FEET: ICD-10-CM

## 2023-01-28 LAB
CHOLEST SERPL-MCNC: 200 MG/DL (ref ?–200)
COMPLEXED PSA SERPL-MCNC: 1.89 NG/ML (ref ?–4)
EST. AVERAGE GLUCOSE BLD GHB EST-MCNC: 123 MG/DL (ref 68–126)
FASTING PATIENT LIPID ANSWER: YES
HBA1C MFR BLD: 5.9 % (ref ?–5.7)
HDLC SERPL-MCNC: 48 MG/DL (ref 40–59)
LDLC SERPL CALC-MCNC: 133 MG/DL (ref ?–100)
NONHDLC SERPL-MCNC: 152 MG/DL (ref ?–130)
TESTOST SERPL-MCNC: 237.29 NG/DL
TRIGL SERPL-MCNC: 106 MG/DL (ref 30–149)
TSI SER-ACNC: 1.95 MIU/ML (ref 0.36–3.74)
VLDLC SERPL CALC-MCNC: 19 MG/DL (ref 0–30)

## 2023-01-28 PROCEDURE — 3008F BODY MASS INDEX DOCD: CPT | Performed by: INTERNAL MEDICINE

## 2023-01-28 PROCEDURE — 84443 ASSAY THYROID STIM HORMONE: CPT

## 2023-01-28 PROCEDURE — 3077F SYST BP >= 140 MM HG: CPT | Performed by: INTERNAL MEDICINE

## 2023-01-28 PROCEDURE — 84403 ASSAY OF TOTAL TESTOSTERONE: CPT

## 2023-01-28 PROCEDURE — 90750 HZV VACC RECOMBINANT IM: CPT | Performed by: INTERNAL MEDICINE

## 2023-01-28 PROCEDURE — 90471 IMMUNIZATION ADMIN: CPT | Performed by: INTERNAL MEDICINE

## 2023-01-28 PROCEDURE — 80061 LIPID PANEL: CPT

## 2023-01-28 PROCEDURE — 83036 HEMOGLOBIN GLYCOSYLATED A1C: CPT

## 2023-01-28 PROCEDURE — 99396 PREV VISIT EST AGE 40-64: CPT | Performed by: INTERNAL MEDICINE

## 2023-01-28 PROCEDURE — 3080F DIAST BP >= 90 MM HG: CPT | Performed by: INTERNAL MEDICINE

## 2023-01-28 PROCEDURE — 36415 COLL VENOUS BLD VENIPUNCTURE: CPT

## 2023-01-28 NOTE — TELEPHONE ENCOUNTER
Faxed ROR to Dr Ivania Houser/ Dr Dileep Tyler for last OV notes, immunizations, labs, imaging and EKG

## 2023-01-28 NOTE — PATIENT INSTRUCTIONS
Blood work     Shingles vaccine Shingrix - 2nd dose is 2-6 months from now    Follow up in 2-3 weeks for blood pressure check

## 2023-01-30 NOTE — TELEPHONE ENCOUNTER
office called stating they received our request, she states we are requesting records from last year and pt was last seen in 2020. She states we will need a new request for 2020.

## 2023-01-31 ENCOUNTER — PATIENT MESSAGE (OUTPATIENT)
Dept: INTERNAL MEDICINE CLINIC | Facility: CLINIC | Age: 53
End: 2023-01-31

## 2023-02-01 NOTE — TELEPHONE ENCOUNTER
From: Little Gustafson  To: [facility]. Sent: 1/31/2023 2:06 PM CST  Subject: Lab results    [facility],     I have copied Dr. Morales Breeding note from your labs below. It should also be attached to your tests. If you would please send a simple reply back to us confirming you received this message. Thank you very much! Sincerely,  PREETHI Zuleta,      Your A1c is in pre-diabetes range at 5.9. (Normal is below 5.7, and a diagnosis of diabetes is at 6.5.) Decrease carbs and sugar in the diet - decrease bread, rice, pasta, potatoes, juice, soda, alcohol, chips, crackers, tortillas, candy, and desserts. Exercise can also help bring down the blood sugar number. Your testosterone number is in normal range and typically would not warrant injections at this level. You can discuss further with your urologist.     Other results look good. Normal PSA. Normal thyroid. Cholesterol is a little high but is ok. Look forward to seeing you in a few weeks for blood pressure check.       Sincerely,   Dr. Jamaal Ni

## 2023-02-05 ENCOUNTER — ANESTHESIA EVENT (OUTPATIENT)
Dept: ENDOSCOPY | Facility: HOSPITAL | Age: 53
End: 2023-02-05
Payer: COMMERCIAL

## 2023-02-06 ENCOUNTER — ANESTHESIA (OUTPATIENT)
Dept: ENDOSCOPY | Facility: HOSPITAL | Age: 53
End: 2023-02-06
Payer: COMMERCIAL

## 2023-02-06 ENCOUNTER — HOSPITAL ENCOUNTER (OUTPATIENT)
Facility: HOSPITAL | Age: 53
Setting detail: HOSPITAL OUTPATIENT SURGERY
Discharge: HOME | End: 2023-02-06
Attending: STUDENT IN AN ORGANIZED HEALTH CARE EDUCATION/TRAINING PROGRAM | Admitting: STUDENT IN AN ORGANIZED HEALTH CARE EDUCATION/TRAINING PROGRAM
Payer: COMMERCIAL

## 2023-02-06 VITALS
WEIGHT: 315 LBS | BODY MASS INDEX: 47.19 KG/M2 | HEIGHT: 68.5 IN | DIASTOLIC BLOOD PRESSURE: 82 MMHG | OXYGEN SATURATION: 95 % | HEART RATE: 90 BPM | TEMPERATURE: 98 F | SYSTOLIC BLOOD PRESSURE: 150 MMHG | RESPIRATION RATE: 16 BRPM

## 2023-02-06 DIAGNOSIS — Z12.11 COLON CANCER SCREENING: ICD-10-CM

## 2023-02-06 PROCEDURE — 0DJD8ZZ INSPECTION OF LOWER INTESTINAL TRACT, VIA NATURAL OR ARTIFICIAL OPENING ENDOSCOPIC: ICD-10-PCS | Performed by: STUDENT IN AN ORGANIZED HEALTH CARE EDUCATION/TRAINING PROGRAM

## 2023-02-06 RX ORDER — SODIUM CHLORIDE, SODIUM LACTATE, POTASSIUM CHLORIDE, CALCIUM CHLORIDE 600; 310; 30; 20 MG/100ML; MG/100ML; MG/100ML; MG/100ML
INJECTION, SOLUTION INTRAVENOUS CONTINUOUS
Status: DISCONTINUED | OUTPATIENT
Start: 2023-02-06 | End: 2023-02-06

## 2023-02-06 RX ORDER — LIDOCAINE HYDROCHLORIDE 10 MG/ML
INJECTION, SOLUTION EPIDURAL; INFILTRATION; INTRACAUDAL; PERINEURAL AS NEEDED
Status: DISCONTINUED | OUTPATIENT
Start: 2023-02-06 | End: 2023-02-06 | Stop reason: SURG

## 2023-02-06 RX ORDER — NALOXONE HYDROCHLORIDE 0.4 MG/ML
80 INJECTION, SOLUTION INTRAMUSCULAR; INTRAVENOUS; SUBCUTANEOUS AS NEEDED
Status: DISCONTINUED | OUTPATIENT
Start: 2023-02-06 | End: 2023-02-06

## 2023-02-06 RX ADMIN — LIDOCAINE HYDROCHLORIDE 100 MG: 10 INJECTION, SOLUTION EPIDURAL; INFILTRATION; INTRACAUDAL; PERINEURAL at 15:18:00

## 2023-02-06 RX ADMIN — SODIUM CHLORIDE, SODIUM LACTATE, POTASSIUM CHLORIDE, CALCIUM CHLORIDE: 600; 310; 30; 20 INJECTION, SOLUTION INTRAVENOUS at 15:42:00

## 2023-02-06 RX ADMIN — SODIUM CHLORIDE, SODIUM LACTATE, POTASSIUM CHLORIDE, CALCIUM CHLORIDE: 600; 310; 30; 20 INJECTION, SOLUTION INTRAVENOUS at 15:18:00

## 2023-02-20 ENCOUNTER — ANESTHESIA (OUTPATIENT)
Dept: ENDOSCOPY | Facility: HOSPITAL | Age: 53
End: 2023-02-20
Payer: COMMERCIAL

## 2023-02-20 ENCOUNTER — HOSPITAL ENCOUNTER (OUTPATIENT)
Facility: HOSPITAL | Age: 53
Setting detail: HOSPITAL OUTPATIENT SURGERY
Discharge: HOME | End: 2023-02-20
Attending: STUDENT IN AN ORGANIZED HEALTH CARE EDUCATION/TRAINING PROGRAM | Admitting: STUDENT IN AN ORGANIZED HEALTH CARE EDUCATION/TRAINING PROGRAM
Payer: COMMERCIAL

## 2023-02-20 ENCOUNTER — ANESTHESIA EVENT (OUTPATIENT)
Dept: ENDOSCOPY | Facility: HOSPITAL | Age: 53
End: 2023-02-20
Payer: COMMERCIAL

## 2023-02-20 VITALS
SYSTOLIC BLOOD PRESSURE: 172 MMHG | BODY MASS INDEX: 47.19 KG/M2 | WEIGHT: 315 LBS | HEART RATE: 92 BPM | DIASTOLIC BLOOD PRESSURE: 99 MMHG | OXYGEN SATURATION: 95 % | HEIGHT: 68.5 IN | RESPIRATION RATE: 18 BRPM | TEMPERATURE: 98 F

## 2023-02-20 DIAGNOSIS — Z12.11 COLON CANCER SCREENING: ICD-10-CM

## 2023-02-20 PROCEDURE — 0DJD8ZZ INSPECTION OF LOWER INTESTINAL TRACT, VIA NATURAL OR ARTIFICIAL OPENING ENDOSCOPIC: ICD-10-PCS | Performed by: STUDENT IN AN ORGANIZED HEALTH CARE EDUCATION/TRAINING PROGRAM

## 2023-02-20 RX ORDER — SODIUM CHLORIDE, SODIUM LACTATE, POTASSIUM CHLORIDE, CALCIUM CHLORIDE 600; 310; 30; 20 MG/100ML; MG/100ML; MG/100ML; MG/100ML
INJECTION, SOLUTION INTRAVENOUS CONTINUOUS
Status: DISCONTINUED | OUTPATIENT
Start: 2023-02-20 | End: 2023-02-20

## 2023-02-20 RX ORDER — LIDOCAINE HYDROCHLORIDE 10 MG/ML
INJECTION, SOLUTION EPIDURAL; INFILTRATION; INTRACAUDAL; PERINEURAL AS NEEDED
Status: DISCONTINUED | OUTPATIENT
Start: 2023-02-20 | End: 2023-02-20 | Stop reason: SURG

## 2023-02-20 RX ORDER — NALOXONE HYDROCHLORIDE 0.4 MG/ML
80 INJECTION, SOLUTION INTRAMUSCULAR; INTRAVENOUS; SUBCUTANEOUS AS NEEDED
Status: DISCONTINUED | OUTPATIENT
Start: 2023-02-20 | End: 2023-02-20

## 2023-02-20 RX ADMIN — SODIUM CHLORIDE, SODIUM LACTATE, POTASSIUM CHLORIDE, CALCIUM CHLORIDE: 600; 310; 30; 20 INJECTION, SOLUTION INTRAVENOUS at 14:58:00

## 2023-02-20 RX ADMIN — LIDOCAINE HYDROCHLORIDE 5 ML: 10 INJECTION, SOLUTION EPIDURAL; INFILTRATION; INTRACAUDAL; PERINEURAL at 15:02:00

## 2023-02-20 RX ADMIN — SODIUM CHLORIDE, SODIUM LACTATE, POTASSIUM CHLORIDE, CALCIUM CHLORIDE: 600; 310; 30; 20 INJECTION, SOLUTION INTRAVENOUS at 15:16:00

## 2023-02-20 NOTE — ANESTHESIA POSTPROCEDURE EVALUATION
[address] Patient Status:  Hospital Outpatient Surgery   Age/Gender 46year old male MRN [id number]   Location [address] [facility] 28 Attending Ml Lopez MD   [address] Day # 0 PCP Cary Combs MD       Anesthesia Post-op Note    COLONOSCOPY    Procedure Summary     Date: 02/20/23 Room / Location: [facility] ENDOSCOPY 04 / [facility] ENDOSCOPY    Anesthesia Start: 3599 Anesthesia Stop: 8606    Procedure: COLONOSCOPY Diagnosis:       Colon cancer screening      (Poor prep, hemorrhoids)    Surgeons: Ml Lopez MD Anesthesiologist: Vielka Quintana MD    Anesthesia Type: MAC ASA Status: 3          Anesthesia Type: MAC    Vitals Value Taken Time   /99 02/20/23 1535   Temp  02/20/23 1536   Pulse 91 02/20/23 1535   Resp 18 02/20/23 1536   SpO2 97 % 02/20/23 1535   Vitals shown include unvalidated device data. Patient Location: Endoscopy    Anesthesia Type: MAC    Airway Patency: patent    Postop Pain Control: adequate    Mental Status: preanesthetic baseline    Nausea/Vomiting: none    Cardiopulmonary/Hydration status: stable euvolemic    Complications: no apparent anesthesia related complications    Postop vital signs: stable    Dental Exam: Unchanged from Preop    Patient to be discharged home when criteria met.

## 2023-02-22 ENCOUNTER — TELEPHONE (OUTPATIENT)
Dept: INTERNAL MEDICINE CLINIC | Facility: CLINIC | Age: 53
End: 2023-02-22

## 2023-02-22 DIAGNOSIS — I10 PRIMARY HYPERTENSION: ICD-10-CM

## 2023-02-22 NOTE — TELEPHONE ENCOUNTER
LOV: 1/28/2023 with Dr. Ash Schilling  RTC: 2 weeks  Last Relevant Labs: 1/28/2023  Filled: 6/16/2022    #90 with 1 refill    Future Appointments   Date Time Provider Rosa Briggs   3/10/2023  9:40 AM Melodie Jacobo MD [facility] SUB GI

## 2023-02-23 PROBLEM — E55.9 HYPOVITAMINOSIS D: Status: ACTIVE | Noted: 2023-02-23

## 2023-02-23 PROBLEM — E78.00 PURE HYPERCHOLESTEROLEMIA: Status: ACTIVE | Noted: 2023-02-23

## 2023-02-23 PROBLEM — I10 ESSENTIAL HYPERTENSION: Status: ACTIVE | Noted: 2023-02-23

## 2023-02-23 RX ORDER — LOSARTAN POTASSIUM 50 MG/1
50 TABLET ORAL DAILY
Qty: 90 TABLET | Refills: 0 | Status: SHIPPED | OUTPATIENT
Start: 2023-02-23

## 2023-04-12 ENCOUNTER — HOSPITAL ENCOUNTER (OUTPATIENT)
Age: 53
Discharge: HOME | End: 2023-04-12
Attending: EMERGENCY MEDICINE
Payer: COMMERCIAL

## 2023-04-12 VITALS
HEART RATE: 87 BPM | SYSTOLIC BLOOD PRESSURE: 168 MMHG | RESPIRATION RATE: 18 BRPM | HEIGHT: 68 IN | BODY MASS INDEX: 47.74 KG/M2 | WEIGHT: 315 LBS | TEMPERATURE: 98 F | OXYGEN SATURATION: 96 % | DIASTOLIC BLOOD PRESSURE: 102 MMHG

## 2023-04-12 DIAGNOSIS — H00.011 HORDEOLUM EXTERNUM OF RIGHT UPPER EYELID: Primary | ICD-10-CM

## 2023-04-12 PROCEDURE — 99213 OFFICE O/P EST LOW 20 MIN: CPT

## 2023-04-12 RX ORDER — POLYMYXIN B SULFATE AND TRIMETHOPRIM 1; 10000 MG/ML; [USP'U]/ML
1 SOLUTION OPHTHALMIC
Qty: 10 ML | Refills: 0 | Status: SHIPPED | OUTPATIENT
Start: 2023-04-12 | End: 2023-04-17

## 2023-04-12 NOTE — DISCHARGE INSTRUCTIONS
Warm compresses to right eye  Instill polytrim drop to right eye every 3 hours while awake  Return if any worsening symptoms or new concerns  Take motrin as needed for pain

## 2023-04-12 NOTE — ED INITIAL ASSESSMENT (HPI)
Monday, pt got a drop of coolant, at work, into his right eye. Today woke up with right upper lid redness/swelling and pain. Denies change to vision.

## 2023-07-12 ENCOUNTER — OFFICE VISIT (OUTPATIENT)
Dept: INTERNAL MEDICINE CLINIC | Facility: CLINIC | Age: 53
End: 2023-07-12
Payer: COMMERCIAL

## 2023-07-12 VITALS
DIASTOLIC BLOOD PRESSURE: 98 MMHG | HEIGHT: 68 IN | WEIGHT: 315 LBS | BODY MASS INDEX: 47.74 KG/M2 | OXYGEN SATURATION: 96 % | HEART RATE: 84 BPM | SYSTOLIC BLOOD PRESSURE: 150 MMHG | RESPIRATION RATE: 16 BRPM | TEMPERATURE: 98 F

## 2023-07-12 DIAGNOSIS — I10 ESSENTIAL HYPERTENSION: Primary | ICD-10-CM

## 2023-07-12 DIAGNOSIS — R73.01 ELEVATED FASTING GLUCOSE: ICD-10-CM

## 2023-07-12 LAB
CARTRIDGE LOT#: 595 NUMERIC
HEMOGLOBIN A1C: 5.8 % (ref 4.3–5.6)

## 2023-07-12 PROCEDURE — 3077F SYST BP >= 140 MM HG: CPT | Performed by: INTERNAL MEDICINE

## 2023-07-12 PROCEDURE — 99213 OFFICE O/P EST LOW 20 MIN: CPT | Performed by: INTERNAL MEDICINE

## 2023-07-12 PROCEDURE — 3008F BODY MASS INDEX DOCD: CPT | Performed by: INTERNAL MEDICINE

## 2023-07-12 PROCEDURE — 83036 HEMOGLOBIN GLYCOSYLATED A1C: CPT | Performed by: INTERNAL MEDICINE

## 2023-07-12 PROCEDURE — 3080F DIAST BP >= 90 MM HG: CPT | Performed by: INTERNAL MEDICINE

## 2023-07-12 RX ORDER — LOSARTAN POTASSIUM 100 MG/1
100 TABLET ORAL DAILY
Qty: 90 TABLET | Refills: 1 | Status: SHIPPED | OUTPATIENT
Start: 2023-07-12

## 2023-07-12 NOTE — PATIENT INSTRUCTIONS
Take 2 of the losartan 50mg tablets to use up what you have, and then start losartan 100mg tablets once a day.

## 2023-07-18 RX ORDER — POLYETHYLENE GLYCOL 3350 17 G/17G
17 POWDER, FOR SOLUTION ORAL DAILY
COMMUNITY

## 2023-07-24 NOTE — PATIENT INSTRUCTIONS
- Susan Chicas for surgery  - We will stick with the higher dose of losartan (50 mg daily). New prescription sent to [facility] all over the counter medications (like vitamin D) for 1 week prior to surgery  - Follow up later in the summer for your physical.    It was a pleasure seeing you in the clinic today. Thank you for choosing the [facility] office for your healthcare needs. Please call at [phone] with any questions or concerns.     Bladimir Mauricio MD [facility] of Hospital Medicine  Peri Quiroga MD  Available on TEAMS 8a-8p      Patient is a 47y old  Male who presents with a chief complaint of Shortness of breath     SUBJECTIVE / OVERNIGHT EVENTS: Febrile overnight with chills, night sweats, rigors, this AM hypotensive.    REVIEW OF SYSTEMS: Diffuse abdominal and chest tenderness, +diarrhea (3 BMs, loose stools), +vomiting/retching, +sorethroat    MEDICATIONS  (STANDING):  albuterol/ipratropium for Nebulization 3 milliLiter(s) Nebulizer every 6 hours  aspirin enteric coated 81 milliGRAM(s) Oral daily  benzonatate 200 milliGRAM(s) Oral every 8 hours  budesonide 160 MICROgram(s)/formoterol 4.5 MICROgram(s) Inhaler 2 Puff(s) Inhalation two times a day  calcium carbonate   1250 mG (OsCal) 1 Tablet(s) Oral daily  gabapentin 100 milliGRAM(s) Oral at bedtime  heparin   Injectable 5000 Unit(s) SubCutaneous every 12 hours  hydroxychloroquine 400 milliGRAM(s) Oral daily  lactated ringers. 1000 milliLiter(s) (75 mL/Hr) IV Continuous <Continuous>  levothyroxine 125 MICROGram(s) Oral daily  lidocaine   4% Patch 1 Patch Transdermal daily  losartan 50 milliGRAM(s) Oral daily  mycophenolic acid  milliGRAM(s) Oral two times a day  nystatin    Suspension 842663 Unit(s) Oral four times a day  pantoprazole    Tablet 40 milliGRAM(s) Oral before breakfast  piperacillin/tazobactam IVPB.- 3.375 Gram(s) IV Intermittent once  piperacillin/tazobactam IVPB.. 3.375 Gram(s) IV Intermittent every 8 hours  predniSONE   Tablet 40 milliGRAM(s) Oral daily  sodium chloride 0.9%. 1000 milliLiter(s) (75 mL/Hr) IV Continuous <Continuous>  trimethoprim  160 mG/sulfamethoxazole 800 mG 1 Tablet(s) Oral <User Schedule>    MEDICATIONS  (PRN):  acetaminophen     Tablet .. 650 milliGRAM(s) Oral every 6 hours PRN Temp greater or equal to 38C (100.4F), Mild Pain (1 - 3)  aluminum hydroxide/magnesium hydroxide/simethicone Suspension 30 milliLiter(s) Oral every 4 hours PRN Dyspepsia  hydrocodone/homatropine Syrup 10 milliLiter(s) Oral every 6 hours PRN Cough  melatonin 3 milliGRAM(s) Oral at bedtime PRN Insomnia  ondansetron Injectable 4 milliGRAM(s) IV Push every 8 hours PRN Nausea and/or Vomiting      PHYSICAL EXAM:  T(C): 37.3 (07-24-23 @ 11:50), Max: 38.6 (07-24-23 @ 05:15)  T(F): 99.1 (07-24-23 @ 11:50), Max: 101.5 (07-24-23 @ 05:15)  HR: 103 (07-24-23 @ 11:46) (103 - 140)  BP: 87/57 (07-24-23 @ 14:02) (72/50 - 126/83)  RR: 18 (07-24-23 @ 11:46) (18 - 18)  SpO2: 94% (07-24-23 @ 11:46) (94% - 99%)  Wt(kg): --    CONSTITUTIONAL: NAD, diaphoretic  EYES: EOMI; conjunctiva and sclera clear  ENMT: Moist oral mucosa, +thrush, neck rash  RESPIRATORY: Normal respiratory effort; lungs are clear to auscultation bilaterally, no wheezing  CARDIOVASCULAR: Regular rate and rhythm, normal S1 and S2, no murmur/rub/gallop; No lower extremity edema  ABDOMEN: Mild diffusely tender to palpation, nondistended, soft  MUSCULOSKELETAL: No clubbing or cyanosis of digits; no joint swelling or tenderness to palpation  PSYCH: A+O to person, place, and time; affect appropriate  NEUROLOGY: CN 2-12 are intact and symmetric; no gross sensory deficits   SKIN: No rashes; no palpable lesions    LABS: reviewed                        14.6   11.13 )-----------( 310      ( 22 Jul 2023 08:52 )             41.6     07-22    139  |  104  |  10  ----------------------------<  112<H>  3.7   |  24  |  0.84    Ca    9.1      22 Jul 2023 08:52            Urinalysis Basic - ( 22 Jul 2023 08:52 )    Color: x / Appearance: x / SG: x / pH: x  Gluc: 112 mg/dL / Ketone: x  / Bili: x / Urobili: x   Blood: x / Protein: x / Nitrite: x   Leuk Esterase: x / RBC: x / WBC x   Sq Epi: x / Non Sq Epi: x / Bacteria: x

## 2023-07-31 ENCOUNTER — HOSPITAL ENCOUNTER (OUTPATIENT)
Facility: HOSPITAL | Age: 53
Setting detail: HOSPITAL OUTPATIENT SURGERY
Discharge: HOME | End: 2023-07-31
Attending: STUDENT IN AN ORGANIZED HEALTH CARE EDUCATION/TRAINING PROGRAM | Admitting: STUDENT IN AN ORGANIZED HEALTH CARE EDUCATION/TRAINING PROGRAM
Payer: COMMERCIAL

## 2023-07-31 ENCOUNTER — ANESTHESIA (OUTPATIENT)
Dept: ENDOSCOPY | Facility: HOSPITAL | Age: 53
End: 2023-07-31
Payer: COMMERCIAL

## 2023-07-31 ENCOUNTER — ANESTHESIA EVENT (OUTPATIENT)
Dept: ENDOSCOPY | Facility: HOSPITAL | Age: 53
End: 2023-07-31
Payer: COMMERCIAL

## 2023-07-31 VITALS
SYSTOLIC BLOOD PRESSURE: 141 MMHG | HEART RATE: 81 BPM | HEIGHT: 68.25 IN | OXYGEN SATURATION: 97 % | BODY MASS INDEX: 47.74 KG/M2 | DIASTOLIC BLOOD PRESSURE: 89 MMHG | TEMPERATURE: 98 F | WEIGHT: 315 LBS | RESPIRATION RATE: 20 BRPM

## 2023-07-31 DIAGNOSIS — Z87.19 HISTORY OF DIVERTICULITIS: ICD-10-CM

## 2023-07-31 PROCEDURE — 0DBM8ZX EXCISION OF DESCENDING COLON, VIA NATURAL OR ARTIFICIAL OPENING ENDOSCOPIC, DIAGNOSTIC: ICD-10-PCS | Performed by: STUDENT IN AN ORGANIZED HEALTH CARE EDUCATION/TRAINING PROGRAM

## 2023-07-31 PROCEDURE — 88305 TISSUE EXAM BY PATHOLOGIST: CPT | Performed by: STUDENT IN AN ORGANIZED HEALTH CARE EDUCATION/TRAINING PROGRAM

## 2023-07-31 PROCEDURE — 0DBC8ZX EXCISION OF ILEOCECAL VALVE, VIA NATURAL OR ARTIFICIAL OPENING ENDOSCOPIC, DIAGNOSTIC: ICD-10-PCS | Performed by: STUDENT IN AN ORGANIZED HEALTH CARE EDUCATION/TRAINING PROGRAM

## 2023-07-31 PROCEDURE — 0DBL8ZX EXCISION OF TRANSVERSE COLON, VIA NATURAL OR ARTIFICIAL OPENING ENDOSCOPIC, DIAGNOSTIC: ICD-10-PCS | Performed by: STUDENT IN AN ORGANIZED HEALTH CARE EDUCATION/TRAINING PROGRAM

## 2023-07-31 RX ORDER — SODIUM CHLORIDE, SODIUM LACTATE, POTASSIUM CHLORIDE, CALCIUM CHLORIDE 600; 310; 30; 20 MG/100ML; MG/100ML; MG/100ML; MG/100ML
INJECTION, SOLUTION INTRAVENOUS CONTINUOUS
Status: DISCONTINUED | OUTPATIENT
Start: 2023-07-31 | End: 2023-07-31

## 2023-07-31 RX ADMIN — SODIUM CHLORIDE, SODIUM LACTATE, POTASSIUM CHLORIDE, CALCIUM CHLORIDE: 600; 310; 30; 20 INJECTION, SOLUTION INTRAVENOUS at 11:27:00

## 2023-07-31 RX ADMIN — SODIUM CHLORIDE, SODIUM LACTATE, POTASSIUM CHLORIDE, CALCIUM CHLORIDE: 600; 310; 30; 20 INJECTION, SOLUTION INTRAVENOUS at 11:56:00

## 2023-07-31 NOTE — OPERATIVE REPORT
Colonoscopy Operative Report  Patient Name: Romero Mao. YOB: 1970  MRN: [id number]  Procedure: Colonoscopy with polypectomy, biopsy  Pre-operative Diagnosis & Indication: Diverticulitis, prior inadequate bowel prep  Post-operative Diagnosis: Colon polyps X.3, post polypectomy, diverticulosis, hemorrhoids;   IC valve biopsy   Attending Endoscopist: Cristobal Meza M.D. Informed Consent: The planned procedure(s), the explanation of the procedure, its expected benefits, the potential complications and risks and possible alternatives and their benefits and risks were discussed with the patient or the patient's surrogate. The discussion of risks, not limited to but including bleeding, infection, perforation, adverse effects from anesthesia, need for emergency surgery, medication effects, cardiac arrhythmias, missed polyps, and aspiration and death, were discussed with patient. Pt and/or surrogate understood the proposed procedure(s), its risks, benefits and alternatives and wish to proceed with procedure(s). All questions answered in full. After all questions were answered to their satisfaction, a signed, informed, and witnessed consent was obtained. Physical Exam: Heart: regular rate and rhythm. No rubs, murmurs, or gallops. Lungs: Clear to auscultation bilaterally. Abdomen: Soft, non-tender, non distended. Positive bowel sounds. No rebound tenderness, no guarding. A TIME OUT WAS COMPLETED prior to the procedure to confirm the patient, procedure and complete endoscopy safety procedure. Sedation: Monitored Anesthesia Care; ASA class per anesthesiology team   Monitoring: Pulsoximetry, pulse, respirations, and blood pressure, vitals were monitored throughout the entire procedure under monitored anesthesia care.    Preparation Quality:  Neligh Bowel Prep Score: 7  [Right2/ Transverse2/ [email or web address] ]   Procedure: After achieving adequate sedation, and placing the patient in the left lateral decubitus position, a digital rectal examination was performed. The lubricated tip of the  colonoscope was then introduced into the rectum and advanced to the terminal ileum. The appendiceal orifice and ileocecal valve were clearly and distinctly visualized, thus verifying the cecum. The terminal ileum was intubated. The endoscope was then carefully withdrawn from the patient with careful visualization of the colonic mucosa to the best of my ability, with bowel preparation quality as noted above. Air was suctioned to the best of my ability, during withdrawal of the endoscope. When the endoscope reached the rectum, it was placed in a retroflexed position, and the rectal bulb was thus visualized. The endoscope was righted, and air was suctioned from the colon to the best of my ability, as it was during withdrawn from the colon. The endoscope was then removed from the patient. The patient tolerated the procedure without apparent procedural complications. The patient left the procedure room in stable condition for recovery. Toleration: Patient tolerated procedure well   Complications: No immediate complications   Technical Difficulty:  The procedure was not technically difficult   Estimated Blood Loss: Minimal, less than 5mL of estimated blood loss. Withdrawal time: Withdrawal of endoscope was 14 minutes  Findings and Therapeutics:  Terminal Ileum:  The entire examined ileum was normal.   Colon:   Prominent appearing Ileocecal valve - with edema, no ulceration. Biopsies obtained with cold forceps for histology. Pathology pending. One 10mm sessile polyp in the transverse colon. Complete polypectomy with cold snare. Polyp retrieved. Hemostasis. One 6 mm sessile polyp in the transverse colon. Complete polypectomy with cold snare. Polyp retrieved. Hemostasis. One 3 mm sessile polyp in the descending colon. Complete polypectomy with cold snare. Polyp retrieved. Hemostasis. Few diverticuli in the left colon. Small.  Nonbleeding. Rectum: There were small to medium sized, internal  hemorrhoids seen on retroflexion. Recommendations:    Post Colonoscopy/polypectomy precautions, watch for bleeding, infection, perforation, adverse drug reactions.    High fiber diet  Follow-up pathology  Recall colonoscopy in 3 years pending biopsy results    Hermelinda Carson MD  7/31/2023  11:52 AM

## 2023-08-04 NOTE — PROGRESS NOTES
Schedule GI OV 3 months, fecal calpro  -enter recall colonoscopy 3 years please    ---      Rafael Macedo,    You recently had a colonoscopy procedure completed with biopsies of the lining of your colon. The biopsies obtained from your recent colonoscopy indicate that the polyps were hyperplastic adenomas, which, although benign (no evidence of cancer), are considered potentially pre-cancerous and you will need long term follow-up. I am advising you to undergo a repeat colonoscopy in 3 years and at periodic intervals thereafter. We will send you a reminder card when the time of your procedure is near. The biopsies also show some non specific inflammation - this is non specific if you have no gastrointestinal symptoms. I recommend you follow-up in GI clinic in 3 months and also recommend a stool study in 3 months to better follow-up with these findings. Please call the office if you have any questions or concerns about these results.      Sincerely,    Mara Moran MD  [facility]  [phone]

## 2023-12-15 ENCOUNTER — OFFICE VISIT (OUTPATIENT)
Dept: INTERNAL MEDICINE CLINIC | Facility: CLINIC | Age: 53
End: 2023-12-15
Payer: COMMERCIAL

## 2023-12-15 ENCOUNTER — LAB ENCOUNTER (OUTPATIENT)
Dept: LAB | Age: 53
End: 2023-12-15
Attending: INTERNAL MEDICINE
Payer: COMMERCIAL

## 2023-12-15 VITALS
RESPIRATION RATE: 16 BRPM | TEMPERATURE: 99 F | DIASTOLIC BLOOD PRESSURE: 100 MMHG | HEART RATE: 89 BPM | BODY MASS INDEX: 47.74 KG/M2 | HEIGHT: 68 IN | OXYGEN SATURATION: 98 % | WEIGHT: 315 LBS | SYSTOLIC BLOOD PRESSURE: 160 MMHG

## 2023-12-15 DIAGNOSIS — I10 PRIMARY HYPERTENSION: Primary | ICD-10-CM

## 2023-12-15 DIAGNOSIS — R73.03 PRE-DIABETES: ICD-10-CM

## 2023-12-15 DIAGNOSIS — E66.01 CLASS 3 SEVERE OBESITY DUE TO EXCESS CALORIES WITH SERIOUS COMORBIDITY AND BODY MASS INDEX (BMI) OF 45.0 TO 49.9 IN ADULT (HCC): ICD-10-CM

## 2023-12-15 DIAGNOSIS — K59.09 CHRONIC CONSTIPATION: ICD-10-CM

## 2023-12-15 DIAGNOSIS — I10 PRIMARY HYPERTENSION: ICD-10-CM

## 2023-12-15 PROBLEM — E66.813 CLASS 3 SEVERE OBESITY DUE TO EXCESS CALORIES WITH SERIOUS COMORBIDITY AND BODY MASS INDEX (BMI) OF 45.0 TO 49.9 IN ADULT (HCC): Status: ACTIVE | Noted: 2023-12-15

## 2023-12-15 PROBLEM — E66.813 CLASS 3 SEVERE OBESITY DUE TO EXCESS CALORIES WITH SERIOUS COMORBIDITY AND BODY MASS INDEX (BMI) OF 45.0 TO 49.9 IN ADULT: Status: ACTIVE | Noted: 2023-12-15

## 2023-12-15 LAB
ALT SERPL-CCNC: 45 U/L
ANION GAP SERPL CALC-SCNC: 5 MMOL/L (ref 0–18)
AST SERPL-CCNC: 24 U/L (ref 15–37)
BUN BLD-MCNC: 12 MG/DL (ref 9–23)
CALCIUM BLD-MCNC: 9 MG/DL (ref 8.5–10.1)
CHLORIDE SERPL-SCNC: 104 MMOL/L (ref 98–112)
CHOLEST SERPL-MCNC: 211 MG/DL (ref ?–200)
CO2 SERPL-SCNC: 29 MMOL/L (ref 21–32)
CREAT BLD-MCNC: 1.24 MG/DL
EGFRCR SERPLBLD CKD-EPI 2021: 70 ML/MIN/1.73M2 (ref 60–?)
EST. AVERAGE GLUCOSE BLD GHB EST-MCNC: 123 MG/DL (ref 68–126)
FASTING PATIENT LIPID ANSWER: YES
FASTING STATUS PATIENT QL REPORTED: YES
GLUCOSE BLD-MCNC: 118 MG/DL (ref 70–99)
HBA1C MFR BLD: 5.9 % (ref ?–5.7)
HCV AB SERPL QL IA: NONREACTIVE
HDLC SERPL-MCNC: 48 MG/DL (ref 40–59)
LDLC SERPL CALC-MCNC: 138 MG/DL (ref ?–100)
NONHDLC SERPL-MCNC: 163 MG/DL (ref ?–130)
OSMOLALITY SERPL CALC.SUM OF ELEC: 287 MOSM/KG (ref 275–295)
POTASSIUM SERPL-SCNC: 3.7 MMOL/L (ref 3.5–5.1)
SODIUM SERPL-SCNC: 138 MMOL/L (ref 136–145)
TRIGL SERPL-MCNC: 142 MG/DL (ref 30–149)
VLDLC SERPL CALC-MCNC: 26 MG/DL (ref 0–30)

## 2023-12-15 PROCEDURE — 86803 HEPATITIS C AB TEST: CPT | Performed by: INTERNAL MEDICINE

## 2023-12-15 PROCEDURE — 3080F DIAST BP >= 90 MM HG: CPT | Performed by: INTERNAL MEDICINE

## 2023-12-15 PROCEDURE — 83036 HEMOGLOBIN GLYCOSYLATED A1C: CPT | Performed by: INTERNAL MEDICINE

## 2023-12-15 PROCEDURE — 80061 LIPID PANEL: CPT | Performed by: INTERNAL MEDICINE

## 2023-12-15 PROCEDURE — 84460 ALANINE AMINO (ALT) (SGPT): CPT | Performed by: INTERNAL MEDICINE

## 2023-12-15 PROCEDURE — 80048 BASIC METABOLIC PNL TOTAL CA: CPT | Performed by: INTERNAL MEDICINE

## 2023-12-15 PROCEDURE — 90750 HZV VACC RECOMBINANT IM: CPT | Performed by: INTERNAL MEDICINE

## 2023-12-15 PROCEDURE — 99214 OFFICE O/P EST MOD 30 MIN: CPT | Performed by: INTERNAL MEDICINE

## 2023-12-15 PROCEDURE — 90471 IMMUNIZATION ADMIN: CPT | Performed by: INTERNAL MEDICINE

## 2023-12-15 PROCEDURE — 84450 TRANSFERASE (AST) (SGOT): CPT | Performed by: INTERNAL MEDICINE

## 2023-12-15 PROCEDURE — 3008F BODY MASS INDEX DOCD: CPT | Performed by: INTERNAL MEDICINE

## 2023-12-15 PROCEDURE — 3077F SYST BP >= 140 MM HG: CPT | Performed by: INTERNAL MEDICINE

## 2023-12-15 RX ORDER — LISINOPRIL 10 MG/1
10 TABLET ORAL DAILY
Qty: 90 TABLET | Refills: 0 | Status: SHIPPED | OUTPATIENT
Start: 2023-12-15 | End: 2024-12-09

## 2023-12-15 NOTE — PATIENT INSTRUCTIONS
Please complete your fasting labs as soon as possible. Please do not lisinopril (for your high blood pressure) until you complete your labs. Please bring your machine into your next office visit to ensure it is accurate. Please measure your blood pressure and pulse daily and record these values. Please measure your blood pressure once daily after you have been resting for at least 5 minutes. Both feet should be flat on the ground and you should be seated with your back supported. Please communicate your blood pressures to me in 14 days. I recommend the following vaccines -  TDAP (tetanus, diptheriae, pertussis) vaccine every 10 years. Annual flu shot every September, October. Stay up to date with COVID vaccines.

## 2023-12-16 ENCOUNTER — LAB ENCOUNTER (OUTPATIENT)
Dept: LAB | Age: 53
End: 2023-12-16
Attending: STUDENT IN AN ORGANIZED HEALTH CARE EDUCATION/TRAINING PROGRAM
Payer: COMMERCIAL

## 2023-12-16 DIAGNOSIS — K52.9 COLITIS: ICD-10-CM

## 2023-12-16 PROCEDURE — 83993 ASSAY FOR CALPROTECTIN FECAL: CPT

## 2023-12-18 LAB — CALPROTECTIN STL-MCNT: 22 ΜG/G (ref ?–50)

## 2023-12-19 NOTE — PROGRESS NOTES
Rafael Macedo,    Your recent stool study for fecal calprotectin is NORMAL. Please let me know if you have any questions or concerns.      Sincerely,  Corinne Martínez MD

## 2024-01-29 ENCOUNTER — OFFICE VISIT (OUTPATIENT)
Dept: INTERNAL MEDICINE CLINIC | Facility: CLINIC | Age: 54
End: 2024-01-29
Payer: COMMERCIAL

## 2024-01-29 VITALS
RESPIRATION RATE: 18 BRPM | WEIGHT: 315 LBS | HEART RATE: 86 BPM | BODY MASS INDEX: 47.74 KG/M2 | OXYGEN SATURATION: 96 % | DIASTOLIC BLOOD PRESSURE: 100 MMHG | HEIGHT: 68 IN | TEMPERATURE: 98 F | SYSTOLIC BLOOD PRESSURE: 136 MMHG

## 2024-01-29 DIAGNOSIS — R73.03 PRE-DIABETES: ICD-10-CM

## 2024-01-29 DIAGNOSIS — I10 ESSENTIAL HYPERTENSION: Primary | ICD-10-CM

## 2024-01-29 DIAGNOSIS — N52.9 ERECTILE DYSFUNCTION, UNSPECIFIED ERECTILE DYSFUNCTION TYPE: ICD-10-CM

## 2024-01-29 PROCEDURE — 3080F DIAST BP >= 90 MM HG: CPT | Performed by: INTERNAL MEDICINE

## 2024-01-29 PROCEDURE — 99214 OFFICE O/P EST MOD 30 MIN: CPT | Performed by: INTERNAL MEDICINE

## 2024-01-29 PROCEDURE — 3075F SYST BP GE 130 - 139MM HG: CPT | Performed by: INTERNAL MEDICINE

## 2024-01-29 PROCEDURE — 3008F BODY MASS INDEX DOCD: CPT | Performed by: INTERNAL MEDICINE

## 2024-01-29 RX ORDER — LISINOPRIL 20 MG/1
20 TABLET ORAL DAILY
Qty: 90 TABLET | Refills: 1 | Status: SHIPPED | OUTPATIENT
Start: 2024-01-29

## 2024-01-29 NOTE — PATIENT INSTRUCTIONS
Increase dose of lisinopril to 20mg daily (ok to take 2 tablets of the 10mg together to use up what you have).    Omron is a recommended brand     Goal blood pressure is under 140/90

## 2024-01-29 NOTE — PROGRESS NOTES
[facility] Internal Medicine Office Note  Chief Complaint:   Chief Complaint   Patient presents with    Follow - Up       HPI:   This is a 53 year old male coming in for HTN  HPI  HTN  Started on lisinopril 10mg at last appt with Dr. Zelaya in Dec  He notes he is feeling a lot better     He notes a scab on the back of his head that came back. It was present a year ago and came back    Pre-diabetes   A1c higher at 5.9     Requests referral for urology  Occasional ED       Patient Active Problem List   Diagnosis    Primary hypertension    Hypovitaminosis D    Pure hypercholesterolemia    Class 3 severe obesity due to excess calories with serious comorbidity and body mass index (BMI) of 45.0 to 49.9 in adult (HCC)    Pre-diabetes    Chronic constipation     Past Surgical History:   Procedure Laterality Date    Arthroscopy of joint unlisted      Colonoscopy N/A 02/06/2023    Procedure: COLONOSCOPY;  Surgeon: Rony Bear MD;  Location:  ENDOSCOPY    Colonoscopy N/A 02/20/2023    Procedure: COLONOSCOPY;  Surgeon: Rony Bear MD;  Location:  ENDOSCOPY    Colonoscopy N/A 7/31/2023    Procedure: COLONOSCOPY with biopsies and cold snare polypectomy;  Surgeon: Rony Bear MD;  Location:  ENDOSCOPY    Knee surgery  2022    Other surgical history      Cyst Removal in 2010      Family History   Problem Relation Age of Onset    Heart Attack Maternal Grandfather         He had several    Stroke Maternal Grandfather     Obesity Maternal Grandfather     Diabetes Maternal Grandfather         passed 1986    Hypertension Maternal Grandfather     Heart Disorder Maternal Grandfather     Colon Polyps Mother         Non cancerous    Depression Mother         well managed        I reviewed his's Past Medical History, Past Surgical History, Family History and   Social History updated shows  Social History     Socioeconomic History    Marital status: Single   Tobacco Use    Smoking status: Former     Packs/day: 0.00      Years: 23.00     Additional pack years: 0.00     Total pack years: 0.00     Types: Cigarettes     Quit date: 2013     Years since quittin.0    Smokeless tobacco: Never    Tobacco comments:     never more than 2 packs a week   Vaping Use    Vaping Use: Never used   Substance and Sexual Activity    Alcohol use: Yes     Comment: one drink/month    Drug use: Not Currently     Types: Cannabis     Comment: edibles once/month   Other Topics Concern    Caffeine Concern No    Exercise No    Seat Belt Yes    Special Diet No    Stress Concern No    Weight Concern Yes     Comment: terrible diet, bad work and sleep schedule     Allergies:  Allergies   Allergen Reactions    Shellfish-Derived Products Tightness in Throat     Throat tightening, ears red/flushed    Losartan OTHER (SEE COMMENTS)     Depression, low energy     Current Outpatient Medications   Medication Sig Dispense Refill    lisinopril 20 MG Oral Tab Take 1 tablet (20 mg total) by mouth daily. 90 tablet 1    polyethylene glycol, PEG 3350, 17 g Oral Powd Pack Take 17 g by mouth daily.           REVIEW OF SYSTEMS:   Review of Systems   Constitutional:  Negative for fever.   Eyes:  Negative for visual disturbance.   Respiratory:  Negative for shortness of breath.    Cardiovascular:  Negative for chest pain.   Gastrointestinal:  Negative for constipation.   Neurological: Negative.    Hematological: Negative.    Psychiatric/Behavioral: Negative.          EXAM:   BP (!) 136/100   Pulse 86   Temp 98 °F (36.7 °C) (Temporal)   Resp 18   Ht 5' 8\" (1.727 m)   Wt (!) 322 lb 12.8 oz (146.4 kg)   SpO2 96%   BMI 49.08 kg/m²  Estimated body mass index is 49.08 kg/m² as calculated from the following:    Height as of this encounter: 5' 8\" (1.727 m).    Weight as of this encounter: 322 lb 12.8 oz (146.4 kg).   Vital signs reviewed. Appears stated age, well groomed.  Physical Exam  Vitals reviewed.   Constitutional:       General: He is not in acute distress.      Appearance: He is well-developed.   HENT:      Head: Normocephalic and atraumatic.   Eyes:      Conjunctiva/sclera: Conjunctivae normal.   Cardiovascular:      Rate and Rhythm: Normal rate and regular rhythm.      Heart sounds: Normal heart sounds.   Pulmonary:      Effort: Pulmonary effort is normal.      Breath sounds: Normal breath sounds.   Musculoskeletal:      Cervical back: Neck supple.   Skin:     General: Skin is warm and dry.   Neurological:      General: No focal deficit present.      Mental Status: He is alert.   Psychiatric:         Mood and Affect: Mood normal.          ASSESSMENT AND PLAN:   Remington Pringle . is a 53 year old male with  1. Essential hypertension    2. Erectile dysfunction, unspecified erectile dysfunction type    3. Pre-diabetes          The plan is as follows  Remington was seen today for follow - up.    Diagnoses and all orders for this visit:    Essential hypertension -increase lisinopril to 20mg as diastolic is elevated at 100.  Monitor at home and get new home BP cuff as his was reading very high. F/u 6 weeks    Erectile dysfunction, unspecified erectile dysfunction type -f/u urology  -     Urology Referral - In Network    Pre-diabetes - A1c 5.8 on recent labs.  Low carb diet. Repeat in 6 months    Other orders  -     lisinopril 20 MG Oral Tab; Take 1 tablet (20 mg total) by mouth daily.      Meds & Refills for this Visit:  Requested Prescriptions     Signed Prescriptions Disp Refills    lisinopril 20 MG Oral Tab 90 tablet 1     Sig: Take 1 tablet (20 mg total) by mouth daily.       Imaging & Consults:  UROLOGY - INTERNAL    Health Maintenance Due   Topic Date Due    DTaP,Tdap,and Td Vaccines (1 - Tdap) Never done    COVID-19 Vaccine (2 - 2023-24 season) 09/01/2023    Influenza Vaccine (1) 10/01/2023    Annual Depression Screening  01/01/2024    HTN: BP Follow-Up  01/15/2024    Annual Physical  01/28/2024    PSA  01/28/2024     Patient/Caregiver Education: Patient/Caregiver  Education: There are no barriers to learning. Medical education done. Outcome: Patient verbalizes understanding. Patient is notified to call with any questions, complications, allergies, or worsening or changing symptoms.  Patient is to call with any side effects or complications from the treatments as a result of today.     Daniela Caro MD

## 2024-05-08 ENCOUNTER — TELEPHONE (OUTPATIENT)
Dept: INTERNAL MEDICINE CLINIC | Facility: CLINIC | Age: 54
End: 2024-05-08

## 2024-05-08 RX ORDER — LISINOPRIL 20 MG/1
20 TABLET ORAL DAILY
Qty: 90 TABLET | Refills: 0 | Status: SHIPPED | OUTPATIENT
Start: 2024-05-08

## 2024-05-08 NOTE — TELEPHONE ENCOUNTER
lisinopril 20 MG Oral Tab          Sig: Take 1 tablet (20 mg total) by mouth daily.    Disp: 90 tablet    Refills: 1    Start: 5/8/2024    Class: Normal    Non-formulary    Last ordered: 3 months ago (1/29/2024) by Daniela Caro MD    Hypertension Medications Protocol [id number]/08/2024 10:28 AM   Protocol Details Last BP reading less than 140/90    CMP or BMP in past 12 months    In person appointment or virtual visit in the past 12 mos or appointment in next 3 mos    EGFRCR or GFRNAA > 50      LOV 1/29/24   RTC 6 weeks  Filled 1/29/24 90 tabs 1 refill   Last labs 12/15/23  No future appointments.

## 2024-05-14 NOTE — TELEPHONE ENCOUNTER
Patient scheduled appointment:    Future Appointments   Date Time Provider Department Center   5/22/2024 11:30 AM Daniela Caro MD [facility] [place]

## 2024-05-22 ENCOUNTER — OFFICE VISIT (OUTPATIENT)
Dept: INTERNAL MEDICINE CLINIC | Facility: CLINIC | Age: 54
End: 2024-05-22

## 2024-05-22 VITALS
RESPIRATION RATE: 16 BRPM | DIASTOLIC BLOOD PRESSURE: 92 MMHG | HEART RATE: 82 BPM | SYSTOLIC BLOOD PRESSURE: 152 MMHG | HEIGHT: 68 IN | OXYGEN SATURATION: 96 % | BODY MASS INDEX: 47.74 KG/M2 | WEIGHT: 315 LBS | TEMPERATURE: 98 F

## 2024-05-22 DIAGNOSIS — I10 ESSENTIAL HYPERTENSION: Primary | ICD-10-CM

## 2024-05-22 DIAGNOSIS — R05.3 CHRONIC COUGH: ICD-10-CM

## 2024-05-22 DIAGNOSIS — R09.82 POSTNASAL DRIP: ICD-10-CM

## 2024-05-22 PROCEDURE — 3077F SYST BP >= 140 MM HG: CPT | Performed by: INTERNAL MEDICINE

## 2024-05-22 PROCEDURE — 3080F DIAST BP >= 90 MM HG: CPT | Performed by: INTERNAL MEDICINE

## 2024-05-22 PROCEDURE — 99213 OFFICE O/P EST LOW 20 MIN: CPT | Performed by: INTERNAL MEDICINE

## 2024-05-22 PROCEDURE — 3008F BODY MASS INDEX DOCD: CPT | Performed by: INTERNAL MEDICINE

## 2024-05-22 RX ORDER — FLUTICASONE PROPIONATE 50 MCG
2 SPRAY, SUSPENSION (ML) NASAL DAILY
Qty: 1 EACH | Refills: 0 | Status: SHIPPED | OUTPATIENT
Start: 2024-05-22 | End: 2024-06-21

## 2024-05-22 RX ORDER — LISINOPRIL 40 MG/1
40 TABLET ORAL DAILY
Qty: 90 TABLET | Refills: 0 | Status: SHIPPED | OUTPATIENT
Start: 2024-05-22

## 2024-05-22 NOTE — PROGRESS NOTES
[facility] Internal Medicine Office Note  Chief Complaint:   No chief complaint on file.      HPI:   This is a 54 year old male coming in for blood pressure follow up  HPI    High blood pressure   Lisinopril increased to 20mg at last appt in January    His feet hurt present at anterior ankle area   Pain is worse when he is on his feet for a long period of time     Lower back pain occasionally    He has a chronic cough which he notes it pre-dates starting lisinopril   +postnasal drip       Patient Active Problem List   Diagnosis    Primary hypertension    Hypovitaminosis D    Pure hypercholesterolemia    Class 3 severe obesity due to excess calories with serious comorbidity and body mass index (BMI) of 45.0 to 49.9 in adult (HCC)    Pre-diabetes    Chronic constipation     Past Surgical History:   Procedure Laterality Date    Arthroscopy of joint unlisted      Colonoscopy N/A 02/06/2023    Procedure: COLONOSCOPY;  Surgeon: Rony Bear MD;  Location:  ENDOSCOPY    Colonoscopy N/A 02/20/2023    Procedure: COLONOSCOPY;  Surgeon: Rony Bear MD;  Location:  ENDOSCOPY    Colonoscopy N/A 7/31/2023    Procedure: COLONOSCOPY with biopsies and cold snare polypectomy;  Surgeon: Rony Bera MD;  Location:  ENDOSCOPY    Knee surgery  2022    Other surgical history      Cyst Removal in 2010      Family History   Problem Relation Age of Onset    Heart Attack Maternal Grandfather         He had several    Stroke Maternal Grandfather     Obesity Maternal Grandfather     Diabetes Maternal Grandfather         passed 1986    Hypertension Maternal Grandfather     Heart Disorder Maternal Grandfather     Colon Polyps Mother         Non cancerous    Depression Mother         well managed        I reviewed his's Past Medical History, Past Surgical History, Family History and   Social History updated shows  Social History     Socioeconomic History    Marital status: Single   Tobacco Use    Smoking status: Former      Current packs/day: 0.00     Types: Cigarettes     Quit date: 1990     Years since quittin.4    Smokeless tobacco: Never    Tobacco comments:     never more than 2 packs a week   Vaping Use    Vaping status: Never Used   Substance and Sexual Activity    Alcohol use: Yes     Comment: seldom    Drug use: Not Currently     Types: Cannabis     Comment: edibles once/month   Other Topics Concern    Caffeine Concern No    Exercise No    Seat Belt Yes    Special Diet No    Stress Concern No    Weight Concern Yes     Comment: terrible diet, bad work and sleep schedule     Allergies:  Allergies   Allergen Reactions    Shellfish-Derived Products Tightness in Throat     Throat tightening, ears red/flushed    Losartan OTHER (SEE COMMENTS)     Depression, low energy     Current Outpatient Medications   Medication Sig Dispense Refill    lisinopril 40 MG Oral Tab Take 1 tablet (40 mg total) by mouth daily. 90 tablet 0    fluticasone propionate 50 MCG/ACT Nasal Suspension 2 sprays by Each Nare route daily. 1 each 0    polyethylene glycol, PEG 3350, 17 g Oral Powd Pack Take 17 g by mouth daily.           REVIEW OF SYSTEMS:   Review of Systems   Constitutional:  Negative for fever.   Eyes:  Negative for visual disturbance.   Respiratory:  Negative for shortness of breath.    Cardiovascular:  Negative for chest pain.   Gastrointestinal:  Negative for constipation.   Neurological: Negative.    Hematological: Negative.    Psychiatric/Behavioral: Negative.          EXAM:   BP (!) 152/92   Pulse 82   Temp 98.2 °F (36.8 °C) (Temporal)   Resp 16   Ht 5' 8\" (1.727 m)   Wt (!) 322 lb 9.6 oz (146.3 kg)   SpO2 96%   BMI 49.05 kg/m²  Estimated body mass index is 49.05 kg/m² as calculated from the following:    Height as of this encounter: 5' 8\" (1.727 m).    Weight as of this encounter: 322 lb 9.6 oz (146.3 kg).   Vital signs reviewed. Appears stated age, well groomed.  Physical Exam  Vitals reviewed.   Constitutional:        General: He is not in acute distress.     Appearance: He is well-developed.   HENT:      Head: Normocephalic and atraumatic.   Eyes:      Conjunctiva/sclera: Conjunctivae normal.   Cardiovascular:      Rate and Rhythm: Normal rate and regular rhythm.      Heart sounds: Normal heart sounds.   Pulmonary:      Effort: Pulmonary effort is normal.      Breath sounds: Normal breath sounds.   Musculoskeletal:      Cervical back: Neck supple.   Skin:     General: Skin is warm and dry.   Neurological:      Mental Status: He is alert.          ASSESSMENT AND PLAN:   Remington Pringle Jr. is a 54 year old male with  1. Essential hypertension    2. Postnasal drip    3. Chronic cough          The plan is as follows  Diagnoses and all orders for this visit:    Essential hypertension - high BP; increasing lisinopril to 40mg. F/u 6 weeks for BP check    Postnasal drip - starting fluticasone nasal steroid. Discussed potential side effects including dryness of nasal passages and nosebleed. Can use otc saline nasal spray if needed.     Chronic cough -likely related to postnasal drip; see above. Denies reflux symptoms. He notes the cough pre-dates lisinopril.     Other orders  -     lisinopril 40 MG Oral Tab; Take 1 tablet (40 mg total) by mouth daily.  -     fluticasone propionate 50 MCG/ACT Nasal Suspension; 2 sprays by Each Nare route daily.    Meds & Refills for this Visit:  Requested Prescriptions     Signed Prescriptions Disp Refills    lisinopril 40 MG Oral Tab 90 tablet 0     Sig: Take 1 tablet (40 mg total) by mouth daily.    fluticasone propionate 50 MCG/ACT Nasal Suspension 1 each 0     Si sprays by Each Nare route daily.       Imaging & Consults:  None    Health Maintenance Due   Topic Date Due    DTaP,Tdap,and Td Vaccines (1 - Tdap) Never done    COVID-19 Vaccine (2 -  season) 2023    Annual Physical  2024    PSA  2024    HTN: BP Follow-Up  2024     Patient/Caregiver Education:  Patient/Caregiver Education: There are no barriers to learning. Medical education done. Outcome: Patient verbalizes understanding. Patient is notified to call with any questions, complications, allergies, or worsening or changing symptoms.  Patient is to call with any side effects or complications from the treatments as a result of today.     Daniela Caro MD

## 2024-05-22 NOTE — PATIENT INSTRUCTIONS
Increase lisinopril to 40mg. (Take 2 tablets of the 20mg to use up what you have.)    Start fluticasone nasal spray 2 sprays each nostril once a day. Give it 3 weeks to assess how cough is doing

## 2024-09-23 RX ORDER — LISINOPRIL 40 MG/1
40 TABLET ORAL DAILY
Qty: 90 TABLET | Refills: 1 | Status: SHIPPED | OUTPATIENT
Start: 2024-09-23

## 2024-09-23 NOTE — TELEPHONE ENCOUNTER
Protocol failed     Lisinopril 40mg     LOV: 5/22/24   RTC: 6 weeks  Labs: 12/15/23  Filled:  5/22/24 #90   Labs: 12/15/23   No future appointments.

## 2024-12-23 ENCOUNTER — OFFICE VISIT (OUTPATIENT)
Dept: SURGERY | Facility: CLINIC | Age: 54
End: 2024-12-23
Payer: COMMERCIAL

## 2024-12-23 DIAGNOSIS — R35.1 NOCTURIA: ICD-10-CM

## 2024-12-23 DIAGNOSIS — N52.9 ERECTILE DYSFUNCTION OF ORGANIC ORIGIN: Primary | ICD-10-CM

## 2024-12-23 DIAGNOSIS — Z12.5 ENCOUNTER FOR SCREENING PROSTATE SPECIFIC ANTIGEN (PSA) MEASUREMENT: ICD-10-CM

## 2024-12-23 LAB
APPEARANCE: CLEAR
BILIRUBIN: NEGATIVE
GLUCOSE (URINE DIPSTICK): NEGATIVE MG/DL
KETONES (URINE DIPSTICK): NEGATIVE MG/DL
LEUKOCYTES: NEGATIVE
MULTISTIX LOT#: ABNORMAL NUMERIC
NITRITE, URINE: NEGATIVE
OCCULT BLOOD: NEGATIVE
PH, URINE: 6 (ref 4.5–8)
PROTEIN (URINE DIPSTICK): 100 MG/DL
SPECIFIC GRAVITY: 1.02 (ref 1–1.03)
URINE-COLOR: YELLOW
UROBILINOGEN,SEMI-QN: 0.2 MG/DL (ref 0–1.9)

## 2024-12-23 PROCEDURE — 81003 URINALYSIS AUTO W/O SCOPE: CPT | Performed by: SURGERY

## 2024-12-23 PROCEDURE — 99204 OFFICE O/P NEW MOD 45 MIN: CPT | Performed by: SURGERY

## 2024-12-23 RX ORDER — TADALAFIL 20 MG/1
TABLET ORAL
Qty: 30 TABLET | Refills: 11 | Status: SHIPPED | OUTPATIENT
Start: 2024-12-23

## 2024-12-23 NOTE — PROGRESS NOTES
Urology Clinic Note - New Patient    Referring Provider:  No referring provider defined for this encounter.     Primary Care Provider:  Daniela Caro MD     Chief Complaint:   ED, nocturia    HPI & Assessment:   Remington Pringle Jr. is a 54 year old male with history of CAD, hypertension, hyperlipidemia, HUMBERTO (not on CPAP, sleep study years ago) referred for EGD and nocturia.    He endorses nocturia every 2 hours some nights.  He does drink a fair amount of caffeine and works the night shift.  He tries to cut this off several hours before bedtime.  Denies weak urinary stream or gross hematuria.    He endorses ED for 10 years.  He tried Viagra and Cialis in the past with good success.  Without medications he gets 50 to 60% erections.  He had headaches with Viagra so was interested in Cialis.    PSA was 1.89 on 1/28/2023.  Denies family history of prostate or other cancers.    AUA symptom score is 5/3 with LUTS.    Urinalysis (clinic dip): Negative    Plan:   -Sleep study given nocturia and HUMBERTO  -Decrease caffeine 6 hours before bedtime  -Decrease fluids 2-3 hours before bedtime  -Yearly screening PSA  -Start Cialis 10-20 mg as needed for ED       PSA:  Lab Results   Component Value Date    PSAS 1.89 01/28/2023        History:     Past Medical History:    Abdominal hernia    As told after CT scan    Abdominal pain    Bent over to pick something up and felt a pinch on my right side, pain grew worse for a week, has now subsided.    Anesthesia complication    Throat/chest infection after intubation    Arthritis    hands and feet    Atherosclerosis of coronary artery    maybe    Bleeding nose    Change of season bleeds, always left nostril    Bloating    Comes and goes    Blurred vision    Need cheaters    Chronic cough    Got a sinus infection from work, have had a tickle ever since    Constipation    I dont go as much as i feel i should    COVID-19    symptoms loss of taste/smell, fever x 1 day; s/s resolved-yes; not  hospitalized    COVID-19    cold symptoms    Decorative tattoo     unit patch on right shoulder    Essential hypertension    Fatty liver    Feeling lonely    I work nights and have fallen out of all my social circles    Flatulence/gas pain/belching    No more than average    Headache disorder    Every other month or so    High blood pressure    High cholesterol    20 yrs ago    History of cardiac murmur    [place] in [place] felt like I was nauseous and passing out. 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    Hyperlipidemia    Night sweats    Occasionally    Obesity    Pain in joints    See above    Pneumonia due to organism    Sleep apnea    No treatment/No device    Sleep disturbance    Sleep apnia    Visual impairment    Reading glasses       Past Surgical History:   Procedure Laterality Date    Arthroscopy of joint unlisted      Colonoscopy N/A 2023    Procedure: COLONOSCOPY;  Surgeon: Rony Bear MD;  Location:  ENDOSCOPY    Colonoscopy N/A 2023    Procedure: COLONOSCOPY;  Surgeon: Rony Bear MD;  Location:  ENDOSCOPY    Colonoscopy N/A 2023    Procedure: COLONOSCOPY with biopsies and cold snare polypectomy;  Surgeon: Rony Bear MD;  Location:  ENDOSCOPY    Knee surgery      Other surgical history      Cyst Removal in         Family History   Problem Relation Age of Onset    Heart Attack Maternal Grandfather         He had several    Stroke Maternal Grandfather     Obesity Maternal Grandfather     Diabetes Maternal Grandfather         passed     Hypertension Maternal Grandfather     Heart Disorder Maternal Grandfather     Colon Polyps Mother         Non cancerous    Depression Mother         well managed       Social History     Socioeconomic History    Marital status: Single   Tobacco Use    Smoking status: Former     Current packs/day: 0.00     Types: Cigarettes     Quit date: 1990     Years since quittin.0    Smokeless tobacco: Never     Tobacco comments:     never more than 2 packs a week   Vaping Use    Vaping status: Never Used   Substance and Sexual Activity    Alcohol use: Yes     Comment: seldom    Drug use: Not Currently     Types: Cannabis     Comment: edibles once/month   Other Topics Concern    Caffeine Concern No    Exercise No    Seat Belt Yes    Special Diet No    Stress Concern No    Weight Concern Yes     Comment: terrible diet, bad work and sleep schedule       Medications (Active prior to today's visit):  Current Outpatient Medications   Medication Sig Dispense Refill    lisinopril 40 MG Oral Tab Take 1 tablet (40 mg total) by mouth daily. 90 tablet 1    polyethylene glycol, PEG 3350, 17 g Oral Powd Pack Take 17 g by mouth daily.         Allergies:  Allergies[1]      Review of Systems:   A comprehensive 10-point review of systems was completed.  Pertinent positives and negatives are noted in the the HPI.    Physical Exam:   CONSTITUTIONAL: Well developed, well nourished, in no acute distress  NEUROLOGIC: Alert and oriented  HEAD: Normocephalic, atraumatic  EYES: Sclera non-icteric  ENT: Hearing intact, moist mucous membranes  NECK: No obvious goiter or masses  RESPIRATORY: Normal respiratory effort  SKIN: No evident rashes  ABDOMEN: Soft, non-tender, non-distended  GENITOURINARY: Normal phallus, orthotopic meatus, normal bilateral testicles  DIGITAL RECTAL EXAM: ~30 gram prostate, no nodules or tenderness    Thank you for this consult.    I have personally reviewed all relevant medical records, labs, and imaging.      Medical Decision Making  ED: Undiagnosed new problem  Nocturia: Undiagnosed new problem    Amount and/or Complexity of Data Reviewed  External Data Reviewed: labs and notes.  Labs: ordered.    Risk  Prescription drug management.        Ty Butt MD  Staff Urologist  [facility]  Office: [phone]           [1]   Allergies  Allergen Reactions    Shellfish-Derived Products Tightness in Throat      Throat tightening, ears red/flushed    Losartan OTHER (SEE COMMENTS)     Depression, low energy

## 2025-01-28 ENCOUNTER — TELEPHONE (OUTPATIENT)
Dept: ORTHOPEDICS CLINIC | Facility: CLINIC | Age: 55
End: 2025-01-28

## 2025-01-28 DIAGNOSIS — M79.672 BILATERAL FOOT PAIN: Primary | ICD-10-CM

## 2025-01-28 DIAGNOSIS — M79.671 BILATERAL FOOT PAIN: Primary | ICD-10-CM

## 2025-01-28 NOTE — TELEPHONE ENCOUNTER
XR ordered per ortho protocol. XR scheduled and patient was notified via [employer] to let them know that they should arrive 15-20 minutes early, in order for them to complete imaging.

## 2025-01-30 ENCOUNTER — HOSPITAL ENCOUNTER (OUTPATIENT)
Dept: GENERAL RADIOLOGY | Age: 55
Discharge: HOME | End: 2025-01-30
Attending: PODIATRIST
Payer: COMMERCIAL

## 2025-01-30 ENCOUNTER — OFFICE VISIT (OUTPATIENT)
Dept: ORTHOPEDICS CLINIC | Facility: CLINIC | Age: 55
End: 2025-01-30
Payer: COMMERCIAL

## 2025-01-30 VITALS — BODY MASS INDEX: 47.74 KG/M2 | WEIGHT: 315 LBS | HEIGHT: 68 IN

## 2025-01-30 DIAGNOSIS — M72.2 PLANTAR FASCIITIS: ICD-10-CM

## 2025-01-30 DIAGNOSIS — M25.879 IMPINGEMENT OF ANKLE JOINT: ICD-10-CM

## 2025-01-30 DIAGNOSIS — M19.072 ARTHRITIS OF BOTH MIDFEET: ICD-10-CM

## 2025-01-30 DIAGNOSIS — M79.672 BILATERAL FOOT PAIN: ICD-10-CM

## 2025-01-30 DIAGNOSIS — M79.671 BILATERAL FOOT PAIN: ICD-10-CM

## 2025-01-30 DIAGNOSIS — M79.671 BILATERAL FOOT PAIN: Primary | ICD-10-CM

## 2025-01-30 DIAGNOSIS — M79.672 BILATERAL FOOT PAIN: Primary | ICD-10-CM

## 2025-01-30 DIAGNOSIS — M19.071 ARTHRITIS OF BOTH MIDFEET: ICD-10-CM

## 2025-01-30 DIAGNOSIS — E66.3 PATIENT OVERWEIGHT: ICD-10-CM

## 2025-01-30 PROCEDURE — 73630 X-RAY EXAM OF FOOT: CPT | Performed by: PODIATRIST

## 2025-01-30 PROCEDURE — 99204 OFFICE O/P NEW MOD 45 MIN: CPT | Performed by: PODIATRIST

## 2025-01-30 PROCEDURE — 3008F BODY MASS INDEX DOCD: CPT | Performed by: PODIATRIST

## 2025-01-30 RX ORDER — MELOXICAM 15 MG/1
15 TABLET ORAL DAILY
Qty: 15 TABLET | Refills: 1 | Status: SHIPPED | OUTPATIENT
Start: 2025-01-30

## 2025-01-30 NOTE — PROGRESS NOTES
[facility] New Patient Note    CC:   Chief Complaint   Patient presents with    Foot Pain     Bilateral foot pain  Onset: 1993 when he was diagnosed with plantar fasciitis        HPI: The patient is a 54 year old male who presents today with complaints of ankle pain and heel pain    Hx of PF    Was in the   On feet all day for work -   Steel toe work boots  Hx knee surgery    Has custom orthotics - old now  Had issue with smell          Past Medical History:    Abdominal hernia    As told after CT scan    Abdominal pain    Bent over to pick something up and felt a pinch on my right side, pain grew worse for a week, has now subsided.    Anesthesia complication    Throat/chest infection after intubation    Arthritis    hands and feet    Atherosclerosis of coronary artery    maybe    Bleeding nose    Change of season bleeds, always left nostril    Bloating    Comes and goes    Blurred vision    Need cheaters    Chronic cough    Got a sinus infection from work, have had a tickle ever since    Constipation    I dont go as much as i feel i should    COVID-19    symptoms loss of taste/smell, fever x 1 day; s/s resolved-yes; not hospitalized    COVID-19    cold symptoms    Decorative tattoo     unit patch on right shoulder    Essential hypertension    Fatty liver    Feeling lonely    I work nights and have fallen out of all my social circles    Flatulence/gas pain/belching    No more than average    Headache disorder    Every other month or so    High blood pressure    High cholesterol    20 yrs ago    History of cardiac murmur    Swimming in [place] felt like I was nauseous and passing out. 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    Hyperlipidemia    Night sweats    Occasionally    Obesity    Pain in joints    See above    Pneumonia due to organism    Sleep apnea    No treatment/No device    Sleep disturbance    Sleep apnia    Visual impairment    Reading glasses     Past Surgical  History:   Procedure Laterality Date    Arthroscopy of joint unlisted      Colonoscopy N/A 2023    Procedure: COLONOSCOPY;  Surgeon: Rony Bear MD;  Location:  ENDOSCOPY    Colonoscopy N/A 2023    Procedure: COLONOSCOPY;  Surgeon: Rony Bear MD;  Location:  ENDOSCOPY    Colonoscopy N/A 2023    Procedure: COLONOSCOPY with biopsies and cold snare polypectomy;  Surgeon: Rony Bear MD;  Location:  ENDOSCOPY    Knee surgery      Other surgical history      Cyst Removal in       Current Outpatient Medications   Medication Sig Dispense Refill    Tadalafil (CIALIS) 20 MG Oral Tab Take 0.5-1 tablets (10-20 mg total) by mouth daily as needed for Erectile Dysfunction. 30 tablet 11    lisinopril 40 MG Oral Tab Take 1 tablet (40 mg total) by mouth daily. 90 tablet 1    polyethylene glycol, PEG 3350, 17 g Oral Powd Pack Take 17 g by mouth daily. (Patient not taking: Reported on 2025)       Allergies[1]  Family History   Problem Relation Age of Onset    Heart Attack Maternal Grandfather         He had several    Stroke Maternal Grandfather     Obesity Maternal Grandfather     Diabetes Maternal Grandfather         passed     Hypertension Maternal Grandfather     Heart Disorder Maternal Grandfather     Colon Polyps Mother         Non cancerous    Depression Mother         well managed     Social History     Occupational History    Not on file   Tobacco Use    Smoking status: Former     Current packs/day: 0.00     Types: Cigarettes     Quit date: 1990     Years since quittin.1    Smokeless tobacco: Never    Tobacco comments:     never more than 2 packs a week   Vaping Use    Vaping status: Never Used   Substance and Sexual Activity    Alcohol use: Yes     Comment: seldom    Drug use: Not Currently     Types: Cannabis     Comment: edibles once/month    Sexual activity: Not on file        ROS:  Complete ROS reviewed by me and non-contributory to the chief complaint except as  mentioned above.    Physical Exam:    Ht 5' 8\" (1.727 m)   Wt (!) 322 lb (146.1 kg)   BMI 48.96 kg/m²   Right LE exam  Mild ankle pain anterior ankle right   No swelling  Ankle stable  Pt has pain at plantar fascial insertion area of heel.  No pain with side to side compression of calcaneus.  No swelling.  Negative tinel's sign at medial ankle.      Sensation is intact sharp versus dull.  He can feel light touch to the tips of the toes  Palpable pedal pulses.  Hair growth is present.  Skin is supple and feet are well perfused and warm.    Strength is 5 out of 5 all muscle groups    Full range of motion and nonpainful motion of the ankle joint, subtalar joint, MP joints, and midfoot joints.     Imaging:  midfoot joint faults - early arthritis  Plantar heel spur bialteral   personally viewed, independently interpreted and radiology report read.      Assessment/Diagnoses:  Diagnoses and all orders for this visit:    Bilateral foot pain        Plan:  I reviewed imaging and exam findings with the patient.    We looked at his xrays  He has heel spurs.  We discussed PF and significance of findings on xray  Midfoot arthritis and flat foot  Condition of plantar fascitis (etiology, anatomy) and treatment plan were discussed, including stretching, ice, massage, good shoegear, arch support and consideration of a series of 3 steroid injections.  Physical therapy also discussed.  Consider custom orthotics.        Consider steroid injection in 1 month prn    Start PT  LE eval and treat  Modalities, graston and dry needling if available  Gait eval   Also ankle strengthening - hx of frequent sprains right ankle    Rx meloxicam  Take with food in am  Dc if any side effects discussed  2 week course    Shoe and work boots-types discussed  Where to go ie [facility]    PF education given    Rx custom orthotics  Discussed rationale for custom.  Weight , time on feet, hx of PF and work boots  Rx Rinella orthotics  Issue with \"breaking in\" in  past and also with odor of materials                    Britney Mcrae, DPMPodiatric Surgery  FACFAS  [facility]  [address], [address], [place] [address]   [address] [place], [place] [address] [place] [address].org  [email or web address]  t: [phone]   f: [phone]            This document was partially prepared using Dragon Medical voice recognition software.            [1]   Allergies  Allergen Reactions    Shellfish-Derived Products Tightness in Throat     Throat tightening, ears red/flushed    Losartan OTHER (SEE COMMENTS)     Depression, low energy

## 2025-02-05 ENCOUNTER — OFFICE VISIT (OUTPATIENT)
Dept: INTERNAL MEDICINE CLINIC | Facility: CLINIC | Age: 55
End: 2025-02-05
Payer: COMMERCIAL

## 2025-02-05 ENCOUNTER — LAB ENCOUNTER (OUTPATIENT)
Dept: LAB | Age: 55
End: 2025-02-05
Attending: INTERNAL MEDICINE
Payer: COMMERCIAL

## 2025-02-05 VITALS
SYSTOLIC BLOOD PRESSURE: 164 MMHG | WEIGHT: 315 LBS | HEART RATE: 78 BPM | OXYGEN SATURATION: 96 % | TEMPERATURE: 98 F | RESPIRATION RATE: 16 BRPM | BODY MASS INDEX: 47.74 KG/M2 | DIASTOLIC BLOOD PRESSURE: 90 MMHG | HEIGHT: 68.25 IN

## 2025-02-05 DIAGNOSIS — Z00.00 LABORATORY EXAM ORDERED AS PART OF ROUTINE GENERAL MEDICAL EXAMINATION: ICD-10-CM

## 2025-02-05 DIAGNOSIS — N52.9 ERECTILE DYSFUNCTION, UNSPECIFIED ERECTILE DYSFUNCTION TYPE: ICD-10-CM

## 2025-02-05 DIAGNOSIS — H04.203 EYE TEARING, BILATERAL: ICD-10-CM

## 2025-02-05 DIAGNOSIS — I10 ESSENTIAL HYPERTENSION: ICD-10-CM

## 2025-02-05 DIAGNOSIS — R73.03 PRE-DIABETES: ICD-10-CM

## 2025-02-05 DIAGNOSIS — Z00.00 ENCOUNTER FOR ROUTINE ADULT MEDICAL EXAMINATION: Primary | ICD-10-CM

## 2025-02-05 DIAGNOSIS — K43.9 VENTRAL HERNIA WITHOUT OBSTRUCTION OR GANGRENE: ICD-10-CM

## 2025-02-05 LAB
ALBUMIN SERPL-MCNC: 4.4 G/DL (ref 3.2–4.8)
ALBUMIN/GLOB SERPL: 1.4 {RATIO} (ref 1–2)
ALP LIVER SERPL-CCNC: 93 U/L
ALT SERPL-CCNC: 30 U/L
ANION GAP SERPL CALC-SCNC: 10 MMOL/L (ref 0–18)
AST SERPL-CCNC: 25 U/L (ref ?–34)
BASOPHILS # BLD AUTO: 0.03 X10(3) UL (ref 0–0.2)
BASOPHILS NFR BLD AUTO: 0.5 %
BILIRUB SERPL-MCNC: 0.6 MG/DL (ref 0.3–1.2)
BUN BLD-MCNC: 10 MG/DL (ref 9–23)
CALCIUM BLD-MCNC: 9.1 MG/DL (ref 8.7–10.6)
CHLORIDE SERPL-SCNC: 101 MMOL/L (ref 98–112)
CHOLEST SERPL-MCNC: 197 MG/DL (ref ?–200)
CO2 SERPL-SCNC: 28 MMOL/L (ref 21–32)
CREAT BLD-MCNC: 1.21 MG/DL
EGFRCR SERPLBLD CKD-EPI 2021: 71 ML/MIN/1.73M2 (ref 60–?)
EOSINOPHIL # BLD AUTO: 0.12 X10(3) UL (ref 0–0.7)
EOSINOPHIL NFR BLD AUTO: 1.9 %
ERYTHROCYTE [DISTWIDTH] IN BLOOD BY AUTOMATED COUNT: 13.5 %
EST. AVERAGE GLUCOSE BLD GHB EST-MCNC: 126 MG/DL (ref 68–126)
FASTING PATIENT LIPID ANSWER: YES
FASTING STATUS PATIENT QL REPORTED: YES
GLOBULIN PLAS-MCNC: 3.2 G/DL (ref 2–3.5)
GLUCOSE BLD-MCNC: 109 MG/DL (ref 70–99)
HBA1C MFR BLD: 6 % (ref ?–5.7)
HCT VFR BLD AUTO: 45.2 %
HDLC SERPL-MCNC: 52 MG/DL (ref 40–59)
HGB BLD-MCNC: 14.4 G/DL
IMM GRANULOCYTES # BLD AUTO: 0.02 X10(3) UL (ref 0–1)
IMM GRANULOCYTES NFR BLD: 0.3 %
LDLC SERPL CALC-MCNC: 129 MG/DL (ref ?–100)
LYMPHOCYTES # BLD AUTO: 1.17 X10(3) UL (ref 1–4)
LYMPHOCYTES NFR BLD AUTO: 18.3 %
MCH RBC QN AUTO: 28.7 PG (ref 26–34)
MCHC RBC AUTO-ENTMCNC: 31.9 G/DL (ref 31–37)
MCV RBC AUTO: 90.2 FL
MONOCYTES # BLD AUTO: 0.7 X10(3) UL (ref 0.1–1)
MONOCYTES NFR BLD AUTO: 11 %
NEUTROPHILS # BLD AUTO: 4.35 X10 (3) UL (ref 1.5–7.7)
NEUTROPHILS # BLD AUTO: 4.35 X10(3) UL (ref 1.5–7.7)
NEUTROPHILS NFR BLD AUTO: 68 %
NONHDLC SERPL-MCNC: 145 MG/DL (ref ?–130)
OSMOLALITY SERPL CALC.SUM OF ELEC: 288 MOSM/KG (ref 275–295)
PLATELET # BLD AUTO: 208 10(3)UL (ref 150–450)
POTASSIUM SERPL-SCNC: 3.9 MMOL/L (ref 3.5–5.1)
PROT SERPL-MCNC: 7.6 G/DL (ref 5.7–8.2)
RBC # BLD AUTO: 5.01 X10(6)UL
SODIUM SERPL-SCNC: 139 MMOL/L (ref 136–145)
TRIGL SERPL-MCNC: 86 MG/DL (ref 30–149)
TSI SER-ACNC: 1.52 UIU/ML (ref 0.55–4.78)
VLDLC SERPL CALC-MCNC: 15 MG/DL (ref 0–30)
WBC # BLD AUTO: 6.4 X10(3) UL (ref 4–11)

## 2025-02-05 PROCEDURE — 80061 LIPID PANEL: CPT | Performed by: INTERNAL MEDICINE

## 2025-02-05 PROCEDURE — 83036 HEMOGLOBIN GLYCOSYLATED A1C: CPT | Performed by: INTERNAL MEDICINE

## 2025-02-05 PROCEDURE — 80050 GENERAL HEALTH PANEL: CPT | Performed by: INTERNAL MEDICINE

## 2025-02-05 NOTE — PROGRESS NOTES
[facility] Internal Medicine Office Note  Chief Complaint:   Chief Complaint   Patient presents with    Physical       HPI:   This is a 54 year old male coming in for annual physical   HPI    Blood pressure elevated in office today due to patient not taking his lisinopril last night    Colonoscopy due in 7/2026    He feels like \"my eyes are always wet and sticky\"     He saw podiatry for plantar fascitis and will be starting PT    He notes he needs to start wearing compression stockings     He notes his \"heart feels good\" and palpitations resolved    Received Tdap within the past 10 year at work, about 3 years ago  Shingrix completed   Received flu shot       Patient Active Problem List   Diagnosis    Primary hypertension    Hypovitaminosis D    Pure hypercholesterolemia    Class 3 severe obesity due to excess calories with serious comorbidity and body mass index (BMI) of 45.0 to 49.9 in adult (HCC)    Pre-diabetes    Chronic constipation     Past Surgical History:   Procedure Laterality Date    Arthroscopy of joint unlisted      Colonoscopy N/A 02/06/2023    Procedure: COLONOSCOPY;  Surgeon: Rony Bear MD;  Location:  ENDOSCOPY    Colonoscopy N/A 02/20/2023    Procedure: COLONOSCOPY;  Surgeon: Rony Bear MD;  Location:  ENDOSCOPY    Colonoscopy N/A 7/31/2023    Procedure: COLONOSCOPY with biopsies and cold snare polypectomy;  Surgeon: Rony Bear MD;  Location:  ENDOSCOPY    Knee surgery  2022    Other surgical history      Cyst Removal in 2010      Family History   Problem Relation Age of Onset    Heart Attack Maternal Grandfather         He had several    Stroke Maternal Grandfather     Obesity Maternal Grandfather     Diabetes Maternal Grandfather         passed 1986    Hypertension Maternal Grandfather     Heart Disorder Maternal Grandfather     Colon Polyps Mother         Non cancerous    Depression Mother         well managed        I reviewed his's Past Medical History, Past Surgical  History, Family History and   Social History updated shows  Social History     Socioeconomic History    Marital status: Single   Tobacco Use    Smoking status: Former     Current packs/day: 0.00     Types: Cigarettes     Quit date: 1990     Years since quittin.1    Smokeless tobacco: Never    Tobacco comments:     never more than 2 packs a week   Vaping Use    Vaping status: Never Used   Substance and Sexual Activity    Alcohol use: Yes     Comment: seldom    Drug use: Not Currently     Types: Cannabis     Comment: edibles once/month   Other Topics Concern    Caffeine Concern No    Exercise No    Seat Belt Yes    Special Diet No    Stress Concern No    Weight Concern Yes     Comment: terrible diet, bad work and sleep schedule     Social Drivers of Health     Food Insecurity: No Food Insecurity (2025)    NCSS - Food Insecurity     Worried About Running Out of Food in the Last Year: No     Ran Out of Food in the Last Year: No   Transportation Needs: No Transportation Needs (2025)    NCSS - Transportation     Lack of Transportation: No   Housing Stability: Not At Risk (2025)    NCSS - Housing/Utilities     Has Housing: Yes     Worried About Losing Housing: No     Unable to Get Utilities: No     Allergies:  Allergies[1]  Current Outpatient Medications   Medication Sig Dispense Refill    Meloxicam 15 MG Oral Tab Take 1 tablet (15 mg total) by mouth daily. 15 tablet 1    Tadalafil (CIALIS) 20 MG Oral Tab Take 0.5-1 tablets (10-20 mg total) by mouth daily as needed for Erectile Dysfunction. 30 tablet 11    lisinopril 40 MG Oral Tab Take 1 tablet (40 mg total) by mouth daily. 90 tablet 1    polyethylene glycol, PEG 3350, 17 g Oral Powd Pack Take 17 g by mouth daily as needed.           REVIEW OF SYSTEMS:   Review of Systems   Constitutional:  Negative for fever.   Eyes:  Negative for visual disturbance.   Respiratory:  Negative for shortness of breath.    Cardiovascular:  Negative for chest pain.    Gastrointestinal:  Negative for constipation.   Neurological: Negative.    Hematological: Negative.    Psychiatric/Behavioral: Negative.          EXAM:   BP (!) 164/90   Pulse 78   Temp 98 °F (36.7 °C) (Temporal)   Resp 16   Ht 5' 8.25\" (1.734 m)   Wt (!) 331 lb 9.6 oz (150.4 kg)   SpO2 96%   BMI 50.05 kg/m²  Estimated body mass index is 50.05 kg/m² as calculated from the following:    Height as of this encounter: 5' 8.25\" (1.734 m).    Weight as of this encounter: 331 lb 9.6 oz (150.4 kg).   Vital signs reviewed. Appears stated age, well groomed.  Physical Exam  Vitals reviewed.   Constitutional:       General: He is not in acute distress.     Appearance: He is well-developed.   HENT:      Head: Normocephalic and atraumatic.      Right Ear: Tympanic membrane normal.      Left Ear: Tympanic membrane normal.   Eyes:      Conjunctiva/sclera: Conjunctivae normal.   Cardiovascular:      Rate and Rhythm: Normal rate and regular rhythm.      Heart sounds: Normal heart sounds.   Pulmonary:      Effort: Pulmonary effort is normal.      Breath sounds: Normal breath sounds.   Musculoskeletal:      Cervical back: Neck supple.      Right lower leg: No edema.      Left lower leg: No edema.   Skin:     General: Skin is warm and dry.   Neurological:      General: No focal deficit present.      Mental Status: He is alert.   Psychiatric:         Mood and Affect: Mood normal.          ASSESSMENT AND PLAN:   Remington Carvalho Pino Garcia is a 54 year old male with  1. Encounter for routine adult medical examination    2. Laboratory exam ordered as part of routine general medical examination    3. Pre-diabetes    4. Essential hypertension    5. Erectile dysfunction, unspecified erectile dysfunction type    6. Ventral hernia without obstruction or gangrene    7. Eye tearing, bilateral          The plan is as follows  Remington was seen today for physical.    Diagnoses and all orders for this visit:    Encounter for routine adult medical  examination  -colonoscopy up to date  -received Tdap and Shingrix     Laboratory exam ordered as part of routine general medical examination  -     CBC With Differential With Platelet; Future  -     Comp Metabolic Panel (14); Future  -     Lipid Panel; Future  -     TSH W Reflex To Free T4; Future  -     Hemoglobin A1C; Future    Pre-diabetes -check A1c. Cont low carb diet.     Essential hypertension - high today due to not taking his lisinopril last night. Recommend checking at home with large size BP cuff and send me readings in 1-2 weeks. F/u 4 months     Erectile dysfunction, unspecified erectile dysfunction type - cont present management     Ventral hernia without obstruction or gangrene - ventral hernia present on exam. No tenderness on exam. We discussed following up with general surgery if develop symptoms of pain or cannot easily push back in. Observe for now.  -     Surgery Referral - In Network    Eye tearing, bilateral - recommend follow up with ophthalmology and referred to [facility]. We discussed it could be related to dryness and can use otc lubricating drops such as Refresh    Orders Placed This Encounter   Procedures    CBC With Differential With Platelet    Comp Metabolic Panel (14)    Lipid Panel    TSH W Reflex To Free T4    Hemoglobin A1C       Meds & Refills for this Visit:  Requested Prescriptions      No prescriptions requested or ordered in this encounter       Imaging & Consults:  SURGERY - INTERNAL    Health Maintenance Due   Topic Date Due    DTaP,Tdap,and Td Vaccines (1 - Tdap) Never done    Pneumococcal Vaccine: 50+ Years (1 of 1 - PCV) Never done    Annual Physical  01/28/2024    PSA  01/28/2024    HTN: BP Follow-Up  06/22/2024    COVID-19 Vaccine (2 - 2024-25 season) 09/01/2024    Influenza Vaccine (1) 10/01/2024    Annual Depression Screening  01/01/2025     Patient/Caregiver Education: Patient/Caregiver Education: There are no barriers to learning. Medical education done.  Outcome: Patient verbalizes understanding. Patient is notified to call with any questions, complications, allergies, or worsening or changing symptoms.  Patient is to call with any side effects or complications from the treatments as a result of today.     Daniela Caro MD       [1]   Allergies  Allergen Reactions    Shellfish-Derived Products Tightness in Throat     Throat tightening, ears red/flushed    Losartan OTHER (SEE COMMENTS)     Depression, low energy

## 2025-02-05 NOTE — PATIENT INSTRUCTIONS
Check blood pressure at home and send me readings in 1 to 2 weeks via [employer] or call my nurse.  When checking her blood pressure, sit quietly for a few minutes before checking.  Make sure both feet are on the floor and elevate the arm such as sitting at the kitchen table.  Cuff size should be large arm cuff - Omron     Blood work     Try over the counter lubricating drops such as Refresh     Follow up with ophthalmology such as [facility] in [place] [phone]

## 2025-02-17 ENCOUNTER — APPOINTMENT (OUTPATIENT)
Dept: GENERAL RADIOLOGY | Age: 55
End: 2025-02-17
Attending: EMERGENCY MEDICINE
Payer: COMMERCIAL

## 2025-02-17 ENCOUNTER — HOSPITAL ENCOUNTER (OUTPATIENT)
Age: 55
Discharge: HOME | End: 2025-02-17
Attending: EMERGENCY MEDICINE
Payer: COMMERCIAL

## 2025-02-17 VITALS
HEART RATE: 100 BPM | SYSTOLIC BLOOD PRESSURE: 138 MMHG | RESPIRATION RATE: 16 BRPM | OXYGEN SATURATION: 95 % | DIASTOLIC BLOOD PRESSURE: 80 MMHG | TEMPERATURE: 98 F

## 2025-02-17 DIAGNOSIS — R05.9 COUGH, UNSPECIFIED TYPE: Primary | ICD-10-CM

## 2025-02-17 PROCEDURE — 71046 X-RAY EXAM CHEST 2 VIEWS: CPT | Performed by: EMERGENCY MEDICINE

## 2025-02-17 PROCEDURE — 99214 OFFICE O/P EST MOD 30 MIN: CPT

## 2025-02-17 PROCEDURE — 99213 OFFICE O/P EST LOW 20 MIN: CPT

## 2025-02-17 RX ORDER — BENZONATATE 100 MG/1
100 CAPSULE ORAL 3 TIMES DAILY PRN
Qty: 20 CAPSULE | Refills: 0 | Status: SHIPPED | OUTPATIENT
Start: 2025-02-17

## 2025-02-17 NOTE — DISCHARGE INSTRUCTIONS
Follow-up with your primary care doctor  Take Augmentin twice a day for 10 days  Take benzonatate cough medicine 3 times a day as needed  Return if any worsening symptoms or new concern

## 2025-02-17 NOTE — ED PROVIDER NOTES
Patient Seen in: [facility]      History     Chief Complaint   Patient presents with    Cough/URI     Stated Complaint: Throat Issue    Subjective:   HPI    Inhaled a piece of a cookie this past Saturday.  Since then he has had a persistent cough.  Denies any fevers or chills.  Denies any purulent sputum production.  He denies any other exacerbating leaving factors.      Objective:     Past Medical History:    Abdominal hernia    As told after CT scan    Abdominal pain    Bent over to pick something up and felt a pinch on my right side, pain grew worse for a week, has now subsided.    Anesthesia complication    Throat/chest infection after intubation    Arthritis    hands and feet    Atherosclerosis of coronary artery    maybe    Bleeding nose    Change of season bleeds, always left nostril    Bloating    Comes and goes    Blurred vision    Need cheaters    Chronic cough    Got a sinus infection from work, have had a tickle ever since    Constipation    I dont go as much as i feel i should    COVID-19    symptoms loss of taste/smell, fever x 1 day; s/s resolved-yes; not hospitalized    COVID-19    cold symptoms    Decorative tattoo     unit patch on right shoulder    Essential hypertension    Fatty liver    Feeling lonely    I work nights and have fallen out of all my social circles    Flatulence/gas pain/belching    No more than average    Headache disorder    Every other month or so    High blood pressure    High cholesterol    20 yrs ago    History of cardiac murmur    Swimming in [place] felt like I was nauseous and passing out. 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    Hyperlipidemia    Night sweats    Occasionally    Obesity    Pain in joints    See above    Pneumonia due to organism    Sleep apnea    No treatment/No device    Sleep disturbance    Sleep apnia    Visual impairment    Reading glasses              Past Surgical History:   Procedure Laterality Date    Arthroscopy  of joint unlisted      Colonoscopy N/A 2023    Procedure: COLONOSCOPY;  Surgeon: Rony Bear MD;  Location:  ENDOSCOPY    Colonoscopy N/A 2023    Procedure: COLONOSCOPY;  Surgeon: Rony Bear MD;  Location:  ENDOSCOPY    Colonoscopy N/A 2023    Procedure: COLONOSCOPY with biopsies and cold snare polypectomy;  Surgeon: Rony Bear MD;  Location:  ENDOSCOPY    Knee surgery      Other surgical history      Cyst Removal in                  Social History     Socioeconomic History    Marital status: Single   Tobacco Use    Smoking status: Former     Current packs/day: 0.00     Types: Cigarettes     Quit date: 1990     Years since quittin.1    Smokeless tobacco: Never    Tobacco comments:     never more than 2 packs a week   Vaping Use    Vaping status: Never Used   Substance and Sexual Activity    Alcohol use: Yes     Comment: seldom    Drug use: Not Currently     Types: Cannabis     Comment: edibles once/month   Other Topics Concern    Caffeine Concern No    Exercise No    Seat Belt Yes    Special Diet No    Stress Concern No    Weight Concern Yes     Comment: terrible diet, bad work and sleep schedule     Social Drivers of Health     Food Insecurity: No Food Insecurity (2025)    NCSS - Food Insecurity     Worried About Running Out of Food in the Last Year: No     Ran Out of Food in the Last Year: No   Transportation Needs: No Transportation Needs (2025)    NCSS - Transportation     Lack of Transportation: No   Housing Stability: Not At Risk (2025)    NCSS - Housing/Utilities     Has Housing: Yes     Worried About Losing Housing: No     Unable to Get Utilities: No              Review of Systems    Positive for stated complaint: Throat Issue  Other systems are as noted in HPI.  Constitutional and vital signs reviewed.      All other systems reviewed and negative except as noted above.    Physical Exam     ED Triage Vitals [25 1308]   /80   Pulse 100    Resp 16   Temp 98.2 °F (36.8 °C)   Temp src Oral   SpO2 95 %   O2 Device None (Room air)       Current Vitals:   Vital Signs  BP: 138/80 (Manual.)  Pulse: 100  Resp: 16  Temp: 98.2 °F (36.8 °C)  Temp src: Oral    Oxygen Therapy  SpO2: 95 %  O2 Device: None (Room air)        Physical Exam  General: Alert and oriented. No acute distress.  HEENT: Normocephalic. No evidence of trauma. Extraocular movements are intact.  Cardiovascular exam: Regular rate and rhythm  Lungs: Clear to auscultation bilaterally.  Abdomen: Soft, nondistended, nontender.  Extremities: No evidence of deformity. No clubbing or cyanosis.  Neuro: No focal deficit is noted.    ED Course   Labs Reviewed - No data to display  PA and lateral chest x-ray was negative for evidence of consolidation infiltrate or pneumothorax.  Patient will be placed empirically on Augmentin as a preventative measure for possible aspiration pneumonitis.  He will be given benzonatate cough medicine.  XR CHEST PA + LAT CHEST (CPT=71046) (Final result)  Result time 02/17/25 13:38:56  Final result by Davonte Houser MD (02/17/25 13:38:56)                Impression:    CONCLUSION:  No acute radiographic findings.  No radiopaque foreign body.  There is continued clinical concern, consider CT evaluation.                 MDM   Patient was screened and evaluated during this visit.   As a treating physician attending to the patient, I determined, within reasonable clinical confidence and prior to discharge, that an emergency medical condition was not or was no longer present.  There was no indication for further evaluation, treatment or admission on an emergency basis.  Comprehensive verbal and written discharge and follow-up instructions were provided to help prevent relapse or worsening.  Patient was instructed to follow-up with her primary care provider for further evaluation and treatment, but to return immediately to the ER for worsening, concerning, new, changing or  persisting symptoms.  I discussed the case with the patient and they had no questions, complaints, or concerns.  Patient felt comfortable going home.    ^^Please note that this report has been produced using speech recognition software and may contain errors related to that system including, but not limited to, errors in grammar, punctuation, and spelling, as well as words and phrases that possibly may have been recognized inappropriately.  If there are any questions or concerns, contact the dictating provider for clarification        Medical Decision Making      Disposition and Plan     Clinical Impression:  1. Cough, unspecified type         Disposition:  Discharge  2/17/2025  2:01 pm    Follow-up:  Daniela Caro MD  [address] [place] [id number] [phone]    Call   As needed, If symptoms worsen          Medications Prescribed:  Current Discharge Medication List        START taking these medications    Details   amoxicillin clavulanate 875-125 MG Oral Tab Take 1 tablet by mouth 2 (two) times daily for 10 days.  Qty: 20 tablet, Refills: 0      benzonatate 100 MG Oral Cap Take 1 capsule (100 mg total) by mouth 3 (three) times daily as needed for cough.  Qty: 20 capsule, Refills: 0                 Supplementary Documentation:

## 2025-02-20 ENCOUNTER — OFFICE VISIT (OUTPATIENT)
Dept: INTERNAL MEDICINE CLINIC | Facility: CLINIC | Age: 55
End: 2025-02-20
Payer: COMMERCIAL

## 2025-02-20 VITALS
SYSTOLIC BLOOD PRESSURE: 159 MMHG | WEIGHT: 315 LBS | DIASTOLIC BLOOD PRESSURE: 102 MMHG | BODY MASS INDEX: 47.74 KG/M2 | OXYGEN SATURATION: 94 % | TEMPERATURE: 99 F | HEIGHT: 68.25 IN | HEART RATE: 102 BPM

## 2025-02-20 DIAGNOSIS — J22 LRTI (LOWER RESPIRATORY TRACT INFECTION): Primary | ICD-10-CM

## 2025-02-20 PROCEDURE — 3077F SYST BP >= 140 MM HG: CPT | Performed by: INTERNAL MEDICINE

## 2025-02-20 PROCEDURE — 3080F DIAST BP >= 90 MM HG: CPT | Performed by: INTERNAL MEDICINE

## 2025-02-20 PROCEDURE — 3008F BODY MASS INDEX DOCD: CPT | Performed by: INTERNAL MEDICINE

## 2025-02-20 PROCEDURE — 99214 OFFICE O/P EST MOD 30 MIN: CPT | Performed by: INTERNAL MEDICINE

## 2025-02-20 NOTE — PROGRESS NOTES
Subjective:   Remington Pringle Jr. is a 54 year old male who presents for Follow - Up     5 days ago,  the patient was eating a cookie and some event  made him laug and he happened to aspirate a part of cookie to the lungs.  He persistently coughed.  He came to the emergency room where x-ray was done and it was negative.  The patient was empirically given antibiotics.  The patient is still is coughing but not that bad now.  no fever.  History/Other:    Chief Complaint Reviewed and Verified  No Further Nursing Notes to   Review  Tobacco Reviewed  Allergies Reviewed  Medications Reviewed    Medical History Reviewed  Surgical History Reviewed  Family History   Reviewed  Social History Reviewed         Tobacco:  He smoked tobacco in the past but quit greater than 12 months ago.  Social History     Tobacco Use   Smoking Status Former    Current packs/day: 0.00    Types: Cigarettes    Quit date: 1990    Years since quittin.1   Smokeless Tobacco Never   Tobacco Comments    never more than 2 packs a week        Current Outpatient Medications   Medication Sig Dispense Refill    amoxicillin clavulanate 875-125 MG Oral Tab Take 1 tablet by mouth 2 (two) times daily for 10 days. 20 tablet 0    benzonatate 100 MG Oral Cap Take 1 capsule (100 mg total) by mouth 3 (three) times daily as needed for cough. 20 capsule 0    Meloxicam 15 MG Oral Tab Take 1 tablet (15 mg total) by mouth daily. 15 tablet 1    Tadalafil (CIALIS) 20 MG Oral Tab Take 0.5-1 tablets (10-20 mg total) by mouth daily as needed for Erectile Dysfunction. 30 tablet 11    lisinopril 40 MG Oral Tab Take 1 tablet (40 mg total) by mouth daily. 90 tablet 1    polyethylene glycol, PEG 3350, 17 g Oral Powd Pack Take 17 g by mouth daily as needed. (Patient not taking: Reported on 2025)           Review of Systems:  Pertinent items are noted in HPI.  A comprehensive review of systems was negative.      Objective:   BP (!) 160/100 (BP Location: Left  arm, Patient Position: Sitting, Cuff Size: large)   Pulse 102   Temp 99 °F (37.2 °C) (Temporal)   Ht 5' 8.25\" (1.734 m)   Wt (!) 338 lb 9.6 oz (153.6 kg)   SpO2 94%   BMI 51.11 kg/m²  Estimated body mass index is 51.11 kg/m² as calculated from the following:    Height as of this encounter: 5' 8.25\" (1.734 m).    Weight as of this encounter: 338 lb 9.6 oz (153.6 kg).    General condition: Not in distress.  Speaks in full sentences  HEENT: Atraumatic normocephalic  No cervical or submandibular lymphadenopathy  Chest: Clear to auscultate  Heart: S1-S2 ,no murmur  Abdomen: Soft non tender, no palpable organomegaly  Neurological examination: No facial asymmetry.  No lateralizing neurological signs.  Mental state examination: Good eye to eye contact.  Normal mood and behavior.  Engaged in meaningful conversations.  Extremities:  Normal upper extremity  Legs: No edema      Assessment & Plan:     Irritative cough due to aspiration and possibly aspiration related lower respiratory tract infection-continue with antibiotics for now.  Let us know if there is fever or chills.      Class 3 obesity-  Weight Loss Advice :  A) Eat plant based diet and avoid ultra processed and fast foods.  B) Your portions should be a dinner plate. 1/2 should be vegetables, 1/4 lean protein (chicken, fish, turkey. Tofu), and 1/4 can be carbohydrates.   C)  Your main drink should be water rather than soda or alcohol or sweet coffees  D). Please try to exercise ( brisk walking or jogging) at least 30 minutes five times/week; and do muscle strengthening exercises ( lifting the weight, doing push ups or yoga)  twice a week    E). It is very important to get 7-9 hours of sleep per night    3. Hypertension  BP Readings from Last 3 Encounters:   02/20/25 (!) 159/102   02/17/25 138/80   02/05/25 (!) 164/90   Will continue monitor BP. Continue losartan. Low salt diet. Cpap will also help decrease bp.    Prediabetes -lifestyle modification including  plant based diet  HUMBERTO- will need sleep study. It is already ordered    No follow-ups on file.    Guru Plascencia MD, 2/20/2025, 2:55 PM

## 2025-02-20 NOTE — PATIENT INSTRUCTIONS
Weight Loss Advice :  A) Eat plant based diet and avoid ultra processed and fast foods.  B) Your portions should be a dinner plate. 1/2 should be vegetables, 1/4 lean protein (chicken, fish, turkey. Tofu), and 1/4 can be carbohydrates.   C)  Your main drink should be water rather than soda or alcohol or sweet coffees  D). Please try to exercise ( brisk walking or jogging) at least 30 minutes five times/week; and do muscle strengthening exercises ( lifting the weight, doing push ups or yoga)  twice a week    E). It is very important to get 7-9 hours of sleep per night      If you have fever or chills, please inform me as soon as possible

## 2025-04-16 RX ORDER — LISINOPRIL 40 MG/1
40 TABLET ORAL DAILY
Qty: 90 TABLET | Refills: 3 | Status: SHIPPED | OUTPATIENT
Start: 2025-04-16

## 2025-04-16 NOTE — TELEPHONE ENCOUNTER
Protocol failed     LOV: 2/20/25   RTC: none noted  Filled: 9/23/24 #90 1 REFILL   Labs: 2/5/25   No future appointments.

## 2025-05-12 RX ORDER — TADALAFIL 20 MG/1
TABLET ORAL
Qty: 30 TABLET | Refills: 11 | Status: SHIPPED | OUTPATIENT
Start: 2025-05-12

## 2025-06-05 ENCOUNTER — HOSPITAL ENCOUNTER (OUTPATIENT)
Age: 55
Discharge: HOME | End: 2025-06-05
Payer: COMMERCIAL

## 2025-06-05 VITALS
SYSTOLIC BLOOD PRESSURE: 138 MMHG | DIASTOLIC BLOOD PRESSURE: 76 MMHG | OXYGEN SATURATION: 96 % | HEART RATE: 81 BPM | RESPIRATION RATE: 20 BRPM | TEMPERATURE: 99 F | WEIGHT: 315 LBS | BODY MASS INDEX: 51 KG/M2

## 2025-06-05 DIAGNOSIS — H57.89 REDNESS OF EYE, RIGHT: Primary | ICD-10-CM

## 2025-06-05 PROCEDURE — 99213 OFFICE O/P EST LOW 20 MIN: CPT

## 2025-06-05 RX ORDER — TOBRAMYCIN 3 MG/ML
1 SOLUTION/ DROPS OPHTHALMIC EVERY 4 HOURS
Qty: 1 EACH | Refills: 0 | Status: SHIPPED | OUTPATIENT
Start: 2025-06-05 | End: 2025-06-10

## 2025-06-05 RX ORDER — ACETAMINOPHEN 500 MG
500 TABLET ORAL EVERY 6 HOURS PRN
COMMUNITY

## 2025-06-05 NOTE — ED INITIAL ASSESSMENT (HPI)
C/o right eye redness on and off for six months.Right eye with discharges,  red,  itchy started again on Monday, right worse than left  Right eye with foreign body sensation started this morning

## 2025-06-05 NOTE — DISCHARGE INSTRUCTIONS
Start eye drop with close follow up with [facility].

## 2025-06-05 NOTE — ED PROVIDER NOTES
Patient Seen in: [facility]        History  Chief Complaint   Patient presents with    Eye Problem     Stated Complaint: Rt eye pain/red    Subjective:   HPI  54 yo male presents with R eye redness, irritation, and drainage for the past few months intermittently. He does not wear contact lenses. He had previous bilateral eye surgery.      Objective:     Past Medical History:    Abdominal hernia    As told after CT scan    Abdominal pain    Bent over to pick something up and felt a pinch on my right side, pain grew worse for a week, has now subsided.    Anesthesia complication    Throat/chest infection after intubation    Arthritis    hands and feet    Atherosclerosis of coronary artery    maybe    Bleeding nose    Change of season bleeds, always left nostril    Bloating    Comes and goes    Blurred vision    Need cheaters    Chronic cough    Got a sinus infection from work, have had a tickle ever since    Constipation    I dont go as much as i feel i should    COVID-19    symptoms loss of taste/smell, fever x 1 day; s/s resolved-yes; not hospitalized    COVID-19    cold symptoms    Decorative tattoo     unit patch on right shoulder    Essential hypertension    Fatty liver    Feeling lonely    I work nights and have fallen out of all my social circles    Flatulence/gas pain/belching    No more than average    Headache disorder    Every other month or so    High blood pressure    High cholesterol    20 yrs ago    History of cardiac murmur    Swimming in [place] [employer] felt like I was nauseous and passing out. 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    Hyperlipidemia    Night sweats    Occasionally    Obesity    Pain in joints    See above    Pneumonia due to organism    Sleep apnea    No treatment/No device    Sleep disturbance    Sleep apnia    Visual impairment    Reading glasses              Past Surgical History:   Procedure Laterality Date    Arthroscopy of joint unlisted      Colonoscopy  N/A 2023    Procedure: COLONOSCOPY;  Surgeon: Rony Bear MD;  Location:  ENDOSCOPY    Colonoscopy N/A 2023    Procedure: COLONOSCOPY;  Surgeon: Rony Bear MD;  Location:  ENDOSCOPY    Colonoscopy N/A 2023    Procedure: COLONOSCOPY with biopsies and cold snare polypectomy;  Surgeon: Rony Bear MD;  Location:  ENDOSCOPY    Knee surgery      Other surgical history      Cyst Removal in                  Social History     Socioeconomic History    Marital status: Single   Tobacco Use    Smoking status: Former     Current packs/day: 0.00     Types: Cigarettes     Quit date: 1990     Years since quittin.4    Smokeless tobacco: Never    Tobacco comments:     never more than 2 packs a week   Vaping Use    Vaping status: Never Used   Substance and Sexual Activity    Alcohol use: Yes     Comment: seldom    Drug use: Not Currently     Types: Cannabis     Comment: edibles once/month   Other Topics Concern    Caffeine Concern No    Exercise No    Seat Belt Yes    Special Diet No    Stress Concern No    Weight Concern Yes     Comment: terrible diet, bad work and sleep schedule     Social Drivers of Health     Food Insecurity: No Food Insecurity (2025)    NCSS - Food Insecurity     Worried About Running Out of Food in the Last Year: No     Ran Out of Food in the Last Year: No   Transportation Needs: No Transportation Needs (2025)    NCSS - Transportation     Lack of Transportation: No   Housing Stability: Not At Risk (2025)    NCSS - Housing/Utilities     Has Housing: Yes     Worried About Losing Housing: No     Unable to Get Utilities: No              Review of Systems   All other systems reviewed and are negative.      Positive for stated complaint: Rt eye pain/red  Other systems are as noted in HPI.  Constitutional and vital signs reviewed.      All other systems reviewed and negative except as noted above.                  Physical Exam    ED Triage Vitals [25  1031]   /76   Pulse 81   Resp 20   Temp 99 °F (37.2 °C)   Temp src Oral   SpO2 96 %   O2 Device None (Room air)       Current Vitals:   Vital Signs  BP: 138/76  Pulse: 81  Resp: 20  Temp: 99 °F (37.2 °C)  Temp src: Oral    Oxygen Therapy  SpO2: 96 %  O2 Device: None (Room air)      Right Eye Chart Acuity: 20/70, Uncorrected  Left Eye Chart Acuity: 20/50, Uncorrected      Physical Exam  Vitals and nursing note reviewed.   Constitutional:       General: He is not in acute distress.     Appearance: He is well-developed. He is not ill-appearing or toxic-appearing.   HENT:      Right Ear: Tympanic membrane, ear canal and external ear normal.      Left Ear: Tympanic membrane, ear canal and external ear normal.   Eyes:      Extraocular Movements: Extraocular movements intact.      Pupils: Pupils are equal, round, and reactive to light.      Comments: R conjunctival and scleral injection with no proptosis or pain with eom. Tearing noted.   Cardiovascular:      Rate and Rhythm: Normal rate.   Pulmonary:      Effort: Pulmonary effort is normal.   Skin:     General: Skin is warm and dry.   Neurological:      Mental Status: He is alert and oriented to person, place, and time.                 ED Course  Labs Reviewed - No data to display                             Medical Decision Making  54 yo with eye complaint.    Pertinent Labs & Imaging studies reviewed. (See chart for details).  Patient coming in with eye redness and pain.   Differential diagnosis includes but not limited to conjunctivitis, scleritis, keratitis, traumatic iritis, red eye  Will treat for eye redness.  Will discharge on tobramycin drops with close optho follow up. Patient/Parent is comfortable with this plan.            Problems Addressed:  Redness of eye, right: acute illness or injury        Disposition and Plan     Clinical Impression:  1. Redness of eye, right         Disposition:  Discharge  6/5/2025 11:11 am    Follow-up:  [facility] -  [address] [place] [address]  [phone]  Call             Medications Prescribed:  Discharge Medication List as of 6/5/2025 11:11 AM                Supplementary Documentation:

## (undated) DIAGNOSIS — Z01.89 ENCOUNTER FOR LOWER EXTREMITY COMPARISON IMAGING STUDY: ICD-10-CM

## (undated) DIAGNOSIS — M25.562 LEFT KNEE PAIN, UNSPECIFIED CHRONICITY: Primary | ICD-10-CM

## (undated) DEVICE — 1200CC GUARDIAN II: Brand: GUARDIAN

## (undated) DEVICE — FILTERLINE NASAL ADULT O2/CO2

## (undated) DEVICE — STERILE POLYISOPRENE POWDER-FREE SURGICAL GLOVES: Brand: PROTEXIS

## (undated) DEVICE — STANDARD HYPODERMIC NEEDLE,POLYPROPYLENE HUB: Brand: MONOJECT

## (undated) DEVICE — ENDOSCOPY PACK - LOWER: Brand: MEDLINE INDUSTRIES, INC.

## (undated) DEVICE — GAUZE SPONGES,12 PLY: Brand: CURITY

## (undated) DEVICE — AGGRESSIVE PLUS, ANGLED CUTTER: Brand: FORMULA

## (undated) DEVICE — Device: Brand: DEFENDO AIR/WATER/SUCTION AND BIOPSY VALVE

## (undated) DEVICE — Device: Brand: GPS® III PLATELET CONCENTRATION SYSTEM

## (undated) DEVICE — KNEE ARTHROSCOPY CDS: Brand: MEDLINE INDUSTRIES, INC.

## (undated) DEVICE — ABSORBANT FLOOR PAD

## (undated) DEVICE — 3M™ STERI-STRIP™ REINFORCED ADHESIVE SKIN CLOSURES, R1547, 1/2 IN X 4 IN (12 MM X 100 MM), 6 STRIPS/ENVELOPE: Brand: 3M™ STERI-STRIP™

## (undated) DEVICE — CLOSURE EXOFIN 1.0ML

## (undated) DEVICE — TRAP SPEC REMOVAL ETRAP 15CM

## (undated) DEVICE — MEDI-VAC NON-CONDUCTIVE SUCTION TUBING: Brand: CARDINAL HEALTH

## (undated) DEVICE — SOLUTION  .9 3000ML

## (undated) DEVICE — 3M™ RED DOT™ MONITORING ELECTRODE WITH FOAM TAPE AND STICKY GEL, 50/BAG, 20/CASE, 72/PLT 2570: Brand: RED DOT™

## (undated) DEVICE — 3M™ TEGADERM™ +PAD FILM DRESSING WITH NON-ADHERENT PAD, 3587, 3-1/2 IN X 4-1/8 IN (9 CM X 10.5 CM), 25/CAR, 4 CAR/CS: Brand: 3M™ TEGADERM™

## (undated) DEVICE — ISOPROPYL ALCOHOL 70% 4OZ BTL

## (undated) DEVICE — SLEEVE KENDALL SCD EXPRESS MED

## (undated) DEVICE — #15 STERILE STAINLESS BLADE: Brand: STERILE STAINLESS BLADES

## (undated) DEVICE — 3M™ TEGADERM™ +PAD FILM DRESSING WITH NON-ADHERENT PAD, 3584, 2-3/8 IN X 4 IN (6 CM X 10 CM), 50/CAR, 4 CAR/CS: Brand: 3M™ TEGADERM™

## (undated) DEVICE — COVER LIGHT HANDLE RIGID GREEN

## (undated) DEVICE — STERILE POLYISOPRENE POWDER-FREE SURGICAL GLOVES WITH EMOLLIENT COATING: Brand: PROTEXIS

## (undated) DEVICE — 1000 S-DRAPE TOWEL DRAPE 10/BX: Brand: STERI-DRAPE™

## (undated) DEVICE — SUT MONOCRYL 3-0 PS-2 Y427H

## (undated) DEVICE — KIT ENDO ORCAPOD 160/180/190

## (undated) DEVICE — SNARE 9MM 230CM 2.4MM EXACTO

## (undated) DEVICE — SHEET,DRAPE,40X58,STERILE: Brand: MEDLINE

## (undated) DEVICE — COVER,MAYO STAND,STERILE: Brand: MEDLINE

## (undated) DEVICE — BIOGUARD CLEANING ADAPTER

## (undated) DEVICE — TUBING IRR 16FT CNT WV 3 ASCP

## (undated) DEVICE — 10FT COMBINED O2 DELIVERY/CO2 MONITORING. FILTER WITH MICROSTREAM TYPE LUER: Brand: DUAL ADULT NASAL CANNULA

## (undated) NOTE — LETTER
Date: 8/18/2022    Patient Name: Lobito Morrison. To Whom it may concern: This letter has been written at the patient's request. The above patient was seen at the [facility] for treatment of a medical condition. This patient can return to work without restrictions on 08/22/2022. Follow up as needed. Sincerely,          RAMBO Ross, RAJIV Orthopedic Surgery / Sports Medicine Specialist  [facility]  [address]   [facility]. [facility]  [email or web address]  t: [phone]  o: [phone]  f: [phone]          This note was dictated using Dragon software. While it was briefly proofread prior to completion, some grammatical, spelling, and word choice errors due to dictation may still occur.

## (undated) NOTE — LETTER
Date: 2/20/2025    Patient Name: Remington Pringle .          To Whom it may concern:    This letter has been written at the patient's request. The above patient was seen at [facility] for treatment of a medical condition.    This patient should be excused from attending work from 2/15/25 through 2/23/25    The patient may return to work on 2/24/25.      Sincerely,    Guru Plascencia MD

## (undated) NOTE — LETTER
Date & Time: 12/5/2022, 1:51 PM  Patient: Render Cons. Encounter Provider(s):    Dona Bull MD       To Whom It May Concern:    Remington Pringle was seen and treated in our department on 12/5/2022. He should not return to work until 12/6/22.     If you have any questions or concerns, please do not hesitate to call.        _____________________________  Physician/APC Signature

## (undated) NOTE — LETTER
22  [facility]  Pre-Operative Clearance Request    Patient Name:   Cheri Ramon. :   3/9/1970    Surgeon: Dr. Barbie Ireland             Date of Surgery: 22    Surgical Procedure: LEFT KNEE ARTHROSCOPY PMM       Please Complete:    [x]  History and Physical        [x]  Medical  Clearance                         Testing is ordered by Siva HARPER per anesthesia guidelines                                              **Please fax test results, H&P, and clearance to [phone] and to                                    P. A.T at [phone]**

## (undated) NOTE — LETTER
Date: 5/16/2022    Patient Name: Deirdre To. To Whom it may concern: This letter has been written at the patient's request. The above patient was seen at the [facility] for treatment of a medical condition. Sincerely,      Rae Martinez. Mauricio Rosales MD  Knee, Shoulder, & Elbow Surgery / Sports Medicine Specialist  [facility]  [place] [address], [address] [place] [address]. [email or web address]  t: [phone]  o: [phone]  f: [phone]      [email or web address] [email or web address] MD CRISTINE

## (undated) NOTE — LETTER
Date: 5/27/2022    Patient Name: Latanya Brewer. To Whom it may concern: This letter has been written at the patient's request. [facility]. Please note that the patient will be undergoing a left knee scope meniscectomy and debridement and will be out of work for 2 months minimum after surgery for recovery process. Sincerely,      Braden Scott. Jon Shelley MD  Knee, Shoulder, & Elbow Surgery / Sports Medicine Specialist  [facility]  [place] [address], [address] [place] [address]. [email or web address]  t: [phone]  o: [phone]  f: [phone]      YQQPH [email or web address] MD SILVESTRE